# Patient Record
Sex: FEMALE | Race: WHITE | NOT HISPANIC OR LATINO
[De-identification: names, ages, dates, MRNs, and addresses within clinical notes are randomized per-mention and may not be internally consistent; named-entity substitution may affect disease eponyms.]

---

## 2021-12-08 ENCOUNTER — APPOINTMENT (OUTPATIENT)
Dept: PULMONOLOGY | Facility: CLINIC | Age: 56
End: 2021-12-08
Payer: COMMERCIAL

## 2021-12-08 VITALS
SYSTOLIC BLOOD PRESSURE: 130 MMHG | HEIGHT: 63 IN | HEART RATE: 67 BPM | BODY MASS INDEX: 36.32 KG/M2 | TEMPERATURE: 97.7 F | WEIGHT: 205 LBS | DIASTOLIC BLOOD PRESSURE: 81 MMHG | RESPIRATION RATE: 16 BRPM

## 2021-12-08 DIAGNOSIS — K21.9 GASTRO-ESOPHAGEAL REFLUX DISEASE W/OUT ESOPHAGITIS: ICD-10-CM

## 2021-12-08 DIAGNOSIS — F32.A DEPRESSION, UNSPECIFIED: ICD-10-CM

## 2021-12-08 DIAGNOSIS — Z82.49 FAMILY HISTORY OF ISCHEMIC HEART DISEASE AND OTHER DISEASES OF THE CIRCULATORY SYSTEM: ICD-10-CM

## 2021-12-08 DIAGNOSIS — R06.83 SNORING: ICD-10-CM

## 2021-12-08 DIAGNOSIS — J44.9 CHRONIC OBSTRUCTIVE PULMONARY DISEASE, UNSPECIFIED: ICD-10-CM

## 2021-12-08 DIAGNOSIS — Z83.438 FAMILY HISTORY OF OTHER DISORDER OF LIPOPROTEIN METABOLISM AND OTHER LIPIDEMIA: ICD-10-CM

## 2021-12-08 DIAGNOSIS — D86.9 SARCOIDOSIS, UNSPECIFIED: ICD-10-CM

## 2021-12-08 DIAGNOSIS — E78.5 HYPERLIPIDEMIA, UNSPECIFIED: ICD-10-CM

## 2021-12-08 DIAGNOSIS — I10 ESSENTIAL (PRIMARY) HYPERTENSION: ICD-10-CM

## 2021-12-08 DIAGNOSIS — E66.9 OBESITY, UNSPECIFIED: ICD-10-CM

## 2021-12-08 DIAGNOSIS — F17.200 NICOTINE DEPENDENCE, UNSPECIFIED, UNCOMPLICATED: ICD-10-CM

## 2021-12-08 PROBLEM — Z00.00 ENCOUNTER FOR PREVENTIVE HEALTH EXAMINATION: Status: ACTIVE | Noted: 2021-12-08

## 2021-12-08 PROCEDURE — 99204 OFFICE O/P NEW MOD 45 MIN: CPT

## 2021-12-08 RX ORDER — ATORVASTATIN CALCIUM 80 MG/1
TABLET, FILM COATED ORAL
Refills: 0 | Status: ACTIVE | COMMUNITY

## 2021-12-08 RX ORDER — FLUOXETINE HYDROCHLORIDE 40 MG/1
CAPSULE ORAL
Refills: 0 | Status: ACTIVE | COMMUNITY

## 2021-12-08 RX ORDER — MONTELUKAST 10 MG/1
10 TABLET, FILM COATED ORAL
Refills: 0 | Status: ACTIVE | COMMUNITY

## 2021-12-08 RX ORDER — METOPROLOL TARTRATE 75 MG/1
TABLET, FILM COATED ORAL
Refills: 0 | Status: ACTIVE | COMMUNITY

## 2021-12-08 RX ORDER — FAMOTIDINE 10 MG/ML
VIAL (ML) INTRAVENOUS
Refills: 0 | Status: ACTIVE | COMMUNITY

## 2021-12-08 RX ORDER — FLUTICASONE PROPION/SALMETEROL 500-50 MCG
BLISTER, WITH INHALATION DEVICE INHALATION
Refills: 0 | Status: ACTIVE | COMMUNITY

## 2021-12-08 NOTE — ASSESSMENT
[FreeTextEntry1] : 55yo f with obesity, past alcoholism. she sleeps with her sister in the same bed and her sister notices that she snores heavily, has witnessed apneas, restless sleep and sleep fragmentation. She has a history of COPD and is a current smoker. she is in the process of weaning from this. She also has depression, GERD, HTN. she feels unrefreshed in the morning and sleepy throughout the day, especially when engaged in passive activities. \par She is 18 months sober from alcoholism after her two younger children were taken away by CPS. \par \par will order HSAT to evaluate for ELIZABETH\par I explained the rationale for treatment of ELIZABETH -- to improve quality of life, daytime function and to decrease the cardiometabolic and other medical risks that are associated with untreated ELIZABETH. The patient verbalized understanding.\par I also explained that the patient can expect a follow up call once results of the above study becomes available.\par

## 2021-12-08 NOTE — CONSULT LETTER
[Dear  ___] : Dear  [unfilled], [Consult Letter:] : I had the pleasure of evaluating your patient, [unfilled]. [Please see my note below.] : Please see my note below. [Consult Closing:] : Thank you very much for allowing me to participate in the care of this patient.  If you have any questions, please do not hesitate to contact me. [Sincerely,] : Sincerely, [FreeTextEntry3] : Brittany Pitts MD

## 2021-12-08 NOTE — HISTORY OF PRESENT ILLNESS
[FreeTextEntry1] : 55yo f with obesity, past alcoholism. she sleeps with her sister in the same bed and her sister notices that she snores heavily, has witnessed apneas, restless sleep and sleep fragmentation. She has a history of COPD and is a current smoker. she is in the process of weaning from this. She also has depression, GERD, HTN. she feels unrefreshed in the morning and sleepy throughout the day, especially when engaged in passive activities. \par She is 18 months sober from alcoholism after her two younger children were taken away by CPS.

## 2023-01-31 ENCOUNTER — EMERGENCY (EMERGENCY)
Facility: HOSPITAL | Age: 58
LOS: 1 days | Discharge: HOME | End: 2023-01-31
Attending: EMERGENCY MEDICINE
Payer: COMMERCIAL

## 2023-01-31 VITALS
HEART RATE: 88 BPM | TEMPERATURE: 98 F | RESPIRATION RATE: 22 BRPM | OXYGEN SATURATION: 99 % | SYSTOLIC BLOOD PRESSURE: 101 MMHG | WEIGHT: 199.96 LBS | DIASTOLIC BLOOD PRESSURE: 67 MMHG

## 2023-01-31 VITALS
SYSTOLIC BLOOD PRESSURE: 108 MMHG | RESPIRATION RATE: 18 BRPM | OXYGEN SATURATION: 97 % | HEART RATE: 83 BPM | TEMPERATURE: 99 F | DIASTOLIC BLOOD PRESSURE: 60 MMHG

## 2023-01-31 LAB
ALBUMIN SERPL ELPH-MCNC: 4.1 G/DL — SIGNIFICANT CHANGE UP (ref 3.3–5.2)
ALP SERPL-CCNC: 55 U/L — SIGNIFICANT CHANGE UP (ref 40–120)
ALT FLD-CCNC: 20 U/L — SIGNIFICANT CHANGE UP
ANION GAP SERPL CALC-SCNC: 11 MMOL/L — SIGNIFICANT CHANGE UP (ref 5–17)
APTT BLD: 34.7 SEC — SIGNIFICANT CHANGE UP (ref 27.5–35.5)
AST SERPL-CCNC: 18 U/L — SIGNIFICANT CHANGE UP
BASOPHILS # BLD AUTO: 0.02 K/UL — SIGNIFICANT CHANGE UP (ref 0–0.2)
BASOPHILS NFR BLD AUTO: 0.3 % — SIGNIFICANT CHANGE UP (ref 0–2)
BILIRUB SERPL-MCNC: 0.3 MG/DL — LOW (ref 0.4–2)
BUN SERPL-MCNC: 15.1 MG/DL — SIGNIFICANT CHANGE UP (ref 8–20)
CALCIUM SERPL-MCNC: 9.6 MG/DL — SIGNIFICANT CHANGE UP (ref 8.4–10.5)
CHLORIDE SERPL-SCNC: 102 MMOL/L — SIGNIFICANT CHANGE UP (ref 96–108)
CO2 SERPL-SCNC: 26 MMOL/L — SIGNIFICANT CHANGE UP (ref 22–29)
CREAT SERPL-MCNC: 0.94 MG/DL — SIGNIFICANT CHANGE UP (ref 0.5–1.3)
EGFR: 71 ML/MIN/1.73M2 — SIGNIFICANT CHANGE UP
EOSINOPHIL # BLD AUTO: 0.05 K/UL — SIGNIFICANT CHANGE UP (ref 0–0.5)
EOSINOPHIL NFR BLD AUTO: 0.8 % — SIGNIFICANT CHANGE UP (ref 0–6)
FLUAV AG NPH QL: SIGNIFICANT CHANGE UP
FLUBV AG NPH QL: SIGNIFICANT CHANGE UP
GLUCOSE SERPL-MCNC: 104 MG/DL — HIGH (ref 70–99)
HCT VFR BLD CALC: 40.7 % — SIGNIFICANT CHANGE UP (ref 34.5–45)
HGB BLD-MCNC: 13.7 G/DL — SIGNIFICANT CHANGE UP (ref 11.5–15.5)
IMM GRANULOCYTES NFR BLD AUTO: 0.2 % — SIGNIFICANT CHANGE UP (ref 0–0.9)
INR BLD: 1.03 RATIO — SIGNIFICANT CHANGE UP (ref 0.88–1.16)
LYMPHOCYTES # BLD AUTO: 1.94 K/UL — SIGNIFICANT CHANGE UP (ref 1–3.3)
LYMPHOCYTES # BLD AUTO: 31.8 % — SIGNIFICANT CHANGE UP (ref 13–44)
MCHC RBC-ENTMCNC: 31.6 PG — SIGNIFICANT CHANGE UP (ref 27–34)
MCHC RBC-ENTMCNC: 33.7 GM/DL — SIGNIFICANT CHANGE UP (ref 32–36)
MCV RBC AUTO: 94 FL — SIGNIFICANT CHANGE UP (ref 80–100)
MONOCYTES # BLD AUTO: 0.69 K/UL — SIGNIFICANT CHANGE UP (ref 0–0.9)
MONOCYTES NFR BLD AUTO: 11.3 % — SIGNIFICANT CHANGE UP (ref 2–14)
NEUTROPHILS # BLD AUTO: 3.4 K/UL — SIGNIFICANT CHANGE UP (ref 1.8–7.4)
NEUTROPHILS NFR BLD AUTO: 55.6 % — SIGNIFICANT CHANGE UP (ref 43–77)
PLATELET # BLD AUTO: 233 K/UL — SIGNIFICANT CHANGE UP (ref 150–400)
POTASSIUM SERPL-MCNC: 5 MMOL/L — SIGNIFICANT CHANGE UP (ref 3.5–5.3)
POTASSIUM SERPL-SCNC: 5 MMOL/L — SIGNIFICANT CHANGE UP (ref 3.5–5.3)
PROT SERPL-MCNC: 6.7 G/DL — SIGNIFICANT CHANGE UP (ref 6.6–8.7)
PROTHROM AB SERPL-ACNC: 11.9 SEC — SIGNIFICANT CHANGE UP (ref 10.5–13.4)
RBC # BLD: 4.33 M/UL — SIGNIFICANT CHANGE UP (ref 3.8–5.2)
RBC # FLD: 12.9 % — SIGNIFICANT CHANGE UP (ref 10.3–14.5)
RSV RNA NPH QL NAA+NON-PROBE: SIGNIFICANT CHANGE UP
SARS-COV-2 RNA SPEC QL NAA+PROBE: SIGNIFICANT CHANGE UP
SODIUM SERPL-SCNC: 139 MMOL/L — SIGNIFICANT CHANGE UP (ref 135–145)
TROPONIN T SERPL-MCNC: <0.01 NG/ML — SIGNIFICANT CHANGE UP (ref 0–0.06)
WBC # BLD: 6.11 K/UL — SIGNIFICANT CHANGE UP (ref 3.8–10.5)
WBC # FLD AUTO: 6.11 K/UL — SIGNIFICANT CHANGE UP (ref 3.8–10.5)

## 2023-01-31 PROCEDURE — 94640 AIRWAY INHALATION TREATMENT: CPT

## 2023-01-31 PROCEDURE — 99285 EMERGENCY DEPT VISIT HI MDM: CPT

## 2023-01-31 PROCEDURE — 87637 SARSCOV2&INF A&B&RSV AMP PRB: CPT

## 2023-01-31 PROCEDURE — 99284 EMERGENCY DEPT VISIT MOD MDM: CPT | Mod: 25

## 2023-01-31 PROCEDURE — 71046 X-RAY EXAM CHEST 2 VIEWS: CPT

## 2023-01-31 PROCEDURE — 71046 X-RAY EXAM CHEST 2 VIEWS: CPT | Mod: 26

## 2023-01-31 PROCEDURE — 96360 HYDRATION IV INFUSION INIT: CPT

## 2023-01-31 PROCEDURE — 85025 COMPLETE CBC W/AUTO DIFF WBC: CPT

## 2023-01-31 PROCEDURE — 80053 COMPREHEN METABOLIC PANEL: CPT

## 2023-01-31 PROCEDURE — 85610 PROTHROMBIN TIME: CPT

## 2023-01-31 PROCEDURE — 84484 ASSAY OF TROPONIN QUANT: CPT

## 2023-01-31 PROCEDURE — 93010 ELECTROCARDIOGRAM REPORT: CPT

## 2023-01-31 PROCEDURE — 36415 COLL VENOUS BLD VENIPUNCTURE: CPT

## 2023-01-31 PROCEDURE — 83880 ASSAY OF NATRIURETIC PEPTIDE: CPT

## 2023-01-31 PROCEDURE — 85730 THROMBOPLASTIN TIME PARTIAL: CPT

## 2023-01-31 PROCEDURE — 93005 ELECTROCARDIOGRAM TRACING: CPT

## 2023-01-31 RX ORDER — SODIUM CHLORIDE 9 MG/ML
500 INJECTION INTRAMUSCULAR; INTRAVENOUS; SUBCUTANEOUS ONCE
Refills: 0 | Status: COMPLETED | OUTPATIENT
Start: 2023-01-31 | End: 2023-01-31

## 2023-01-31 RX ORDER — IPRATROPIUM/ALBUTEROL SULFATE 18-103MCG
3 AEROSOL WITH ADAPTER (GRAM) INHALATION ONCE
Refills: 0 | Status: COMPLETED | OUTPATIENT
Start: 2023-01-31 | End: 2023-01-31

## 2023-01-31 RX ORDER — ASPIRIN/CALCIUM CARB/MAGNESIUM 324 MG
1 TABLET ORAL
Qty: 15 | Refills: 0
Start: 2023-01-31 | End: 2023-02-14

## 2023-01-31 RX ORDER — ACETAMINOPHEN 500 MG
975 TABLET ORAL ONCE
Refills: 0 | Status: COMPLETED | OUTPATIENT
Start: 2023-01-31 | End: 2023-01-31

## 2023-01-31 RX ADMIN — SODIUM CHLORIDE 500 MILLILITER(S): 9 INJECTION INTRAMUSCULAR; INTRAVENOUS; SUBCUTANEOUS at 14:18

## 2023-01-31 RX ADMIN — Medication 975 MILLIGRAM(S): at 10:33

## 2023-01-31 RX ADMIN — Medication 3 MILLILITER(S): at 12:47

## 2023-01-31 RX ADMIN — Medication 3 MILLILITER(S): at 10:33

## 2023-01-31 RX ADMIN — Medication 60 MILLIGRAM(S): at 10:33

## 2023-01-31 NOTE — ED PROVIDER NOTE - CLINICAL SUMMARY MEDICAL DECISION MAKING FREE TEXT BOX
57-year-old female history of EtOH abuse, sarcoidosis, COPD, asthma, depression, TIA, presents to the ED complaining of cough, congestion, sore throat x1 day.  Patient states that she woke up this morning with mild chest pressure and shortness of breath. Pts labs reviewed-wnl, troponin negative x1. CXR shows no acute abnormality. EKG with no abnormalities. RVP negative. Pt received multiple nebulizer treatments and prednisone in the ED-- reports significant relief of presenting sob and chest pressure. Pt able to speak in full sentences, in no acute respiratory distress. repeat lung exam CTA b/l. Pt stable for d/c with prescriptions sent to pharmacy. Pt give precautions to return to the ED if her symptoms worsen. Pts VSS, o2 saturation 98% upon discharge.  - continue prednisone as presribed  - tessalon perles as needed for cough  - Pt has inhaler at home to be used as needed  - PCP f/u this week

## 2023-01-31 NOTE — ED PROVIDER NOTE - OBJECTIVE STATEMENT
57-year-old female history of EtOH abuse, sarcoidosis, COPD, asthma, depression, TIA, presents to the ED complaining of cough, congestion, sore throat x1 day.  Patient states that she woke up this morning with mild chest pressure and shortness of breath. Pt otherwise denies fever/chills, abd pain, n/v/c/d, dysuria, numbness/tingling/weakness and has no other complaints at this time.

## 2023-01-31 NOTE — ED PROVIDER NOTE - CPE EDP CARDIAC NORM
TRANSITIONAL CARE MANAGEMENT - HOSPITAL DISCHARGE FOLLOW-UP    Contacted Ms. Ferraro regarding follow-up for seizure after hospital discharge. She was discharged from the hospital on 05/20/2022. Review of the After Visit Summary from the recent hospitalization indicates that the patient needs to Adhere to discharge instructions and follow up with PCP.    She feels that she is doing fairly well at home.   Her diet concern is none. Overall, the patient is eating well.   Ambulation: staying the same  Fever: is not present  Pain: none  Activities of Daily Living (global): Self-care   Patient states that she does have sufficient family support. She feels that she is able to ask for assistance when needed.     Additional patient/family concerns: None .    Discharge medications were verified with the patient. She is fully compliant with the medication regimen prescribed at the time of discharge. She reports that she is not experiencing any medication side effects.    Upon discharge, the patient was to receive specialist follow-up with neuroscience. These services have been initiated. These services have been scheduled and no further arrangements are needed at this time.     Advance Directive:   The patient has the following documents:  No Advance Directives on file. Patient offered documents.    Patient has an appointment on 05/27/2022 with Abdulaziz Mendosa MD. Ms. Ferraro was reminded about the importance of keeping this appointment.      normal...

## 2023-01-31 NOTE — ED ADULT NURSE NOTE - CHPI ED NUR SYMPTOMS POS
CHEST PAIN/COUGH/DIFFICULTY BREATHING/DYSPNEA ON EXERTION/FEVER/NASAL CONGESTION/SHORTNESS OF BREATH

## 2023-01-31 NOTE — ED PROVIDER NOTE - ATTENDING APP SHARED VISIT CONTRIBUTION OF CARE
Reports nasal congestion with yellow/ greenish discharge and ear plugging since yesterday.  Developed cough overnight followed by tightness in chest and difficulty breathing.  Used Advair at 8 AM followed by albuterol at 8:45 with limited relief.  History of COPD/asthma.  Prior hospital admissions without ICU stays.  Active smoker.  Reports compliance with daily Advair and Singulair. Has albuterol inhaler and liquid but states that nebulizer is broken. Uses albuterol as needed. PE: No acute respiratory distress, speaking in full sentences, neck supple/no meningismus, moist mucous membranes, chest clear to auscultation bilateral with end expiratory wheezing/no rales/no rhonchi.  Treated with albuterol inhaler and prednisone.  Reports improvement of symptoms.  A/P: Exacerbation of COPD/asthma secondary to URI.  Steroids and inhaler treatment

## 2023-01-31 NOTE — ED ADULT TRIAGE NOTE - NS ED NURSE AMBULANCES
[facility]

## 2023-01-31 NOTE — ED PROVIDER NOTE - PATIENT PORTAL LINK FT
You can access the FollowMyHealth Patient Portal offered by [facility] by registering at the following website: [email or web address]. By joining [employer]’s FollowMyHealth portal, you will also be able to view your health information using other applications (apps) compatible with our system. You can access the FollowMyHealth Patient Portal offered by [facility] by registering at the following website: [email or web address]. By joining [employer]’s FollowMyHealth portal, you will also be able to view your health information using other applications (apps) compatible with our system. You can access the FollowMyHealth Patient Portal offered by [facility] by registering at the following website: [email or web address]. By joining [employer]’s FollowMyHealth portal, you will also be able to view your health information using other applications (apps) compatible with our system.

## 2023-01-31 NOTE — ED ADULT TRIAGE NOTE - CHIEF COMPLAINT QUOTE
Pt arrives with c/o 2 days of cough, ear pain, sore throat, chills and today woke with c/o SOB and increasing sore throat with difficulty swallowing due to the pain.

## 2023-01-31 NOTE — ED PROVIDER NOTE - PROGRESS NOTE DETAILS
Prior to discharge, pt able to ambulate in the ED without difficulty maintaining o2 saturation of 96% with no reports of acute sob.

## 2023-01-31 NOTE — ED PROVIDER NOTE - NS ED ATTENDING STATEMENT MOD
This was a shared visit with the MARISSA. I reviewed and verified the documentation and independently performed the documented:

## 2023-01-31 NOTE — ED ADULT NURSE NOTE - NSIMPLEMENTINTERV_GEN_ALL_ED
Implemented All Universal Safety Interventions:  [place] to call system. Call bell, personal items and telephone within reach. Instruct patient to call for assistance. Room bathroom lighting operational. Non-slip footwear when patient is off stretcher. Physically safe environment: no spills, clutter or unnecessary equipment. Stretcher in lowest position, wheels locked, appropriate side rails in place. Implemented All Universal Safety Interventions:  [place] to call system. Call bell, personal items and telephone within reach. Instruct patient to call for assistance. Room bathroom lighting operational. Non-slip footwear when patient is off stretcher. Physically safe environment: no spills, clutter or unnecessary equipment. Stretcher in lowest position, wheels locked, appropriate side rails in place. Implemented All Universal Safety Interventions:  [place] to call system. Call bell, personal items and telephone within reach. Instruct patient to call for assistance. Room bathroom lighting operational. Non-slip footwear when patient is off stretcher. Physically safe environment: no spills, clutter or unnecessary equipment. Stretcher in lowest position, wheels locked, appropriate side rails in place.

## 2023-09-11 ENCOUNTER — INPATIENT (INPATIENT)
Facility: HOSPITAL | Age: 58
LOS: 1 days | Discharge: HOME | DRG: 190 | End: 2023-09-13
Attending: HOSPITALIST
Payer: COMMERCIAL

## 2023-09-11 VITALS
TEMPERATURE: 100 F | HEART RATE: 92 BPM | WEIGHT: 195.11 LBS | SYSTOLIC BLOOD PRESSURE: 166 MMHG | HEIGHT: 63 IN | RESPIRATION RATE: 22 BRPM | OXYGEN SATURATION: 93 % | DIASTOLIC BLOOD PRESSURE: 98 MMHG

## 2023-09-11 DIAGNOSIS — Z98.890 OTHER SPECIFIED POSTPROCEDURAL STATES: Chronic | ICD-10-CM

## 2023-09-11 DIAGNOSIS — Z90.89 ACQUIRED ABSENCE OF OTHER ORGANS: Chronic | ICD-10-CM

## 2023-09-11 DIAGNOSIS — Z86.73 PERSONAL HISTORY OF TRANSIENT ISCHEMIC ATTACK (TIA), AND CEREBRAL INFARCTION WITHOUT RESIDUAL DEFICITS: Chronic | ICD-10-CM

## 2023-09-11 DIAGNOSIS — J18.9 PNEUMONIA, UNSPECIFIED ORGANISM: ICD-10-CM

## 2023-09-11 DIAGNOSIS — Z98.891 HISTORY OF UTERINE SCAR FROM PREVIOUS SURGERY: Chronic | ICD-10-CM

## 2023-09-11 LAB
ALBUMIN SERPL ELPH-MCNC: 4.3 G/DL — SIGNIFICANT CHANGE UP (ref 3.3–5.2)
ALP SERPL-CCNC: 63 U/L — SIGNIFICANT CHANGE UP (ref 40–120)
ALT FLD-CCNC: 19 U/L — SIGNIFICANT CHANGE UP
ANION GAP SERPL CALC-SCNC: 13 MMOL/L — SIGNIFICANT CHANGE UP (ref 5–17)
APPEARANCE UR: ABNORMAL
APTT BLD: 31 SEC — SIGNIFICANT CHANGE UP (ref 24.5–35.6)
AST SERPL-CCNC: 18 U/L — SIGNIFICANT CHANGE UP
BACTERIA # UR AUTO: ABNORMAL
BASE EXCESS BLDV CALC-SCNC: 0.5 MMOL/L — SIGNIFICANT CHANGE UP (ref -2–3)
BASE EXCESS BLDV CALC-SCNC: 4 MMOL/L — HIGH (ref -2–3)
BASOPHILS # BLD AUTO: 0.05 K/UL — SIGNIFICANT CHANGE UP (ref 0–0.2)
BASOPHILS NFR BLD AUTO: 0.3 % — SIGNIFICANT CHANGE UP (ref 0–2)
BILIRUB SERPL-MCNC: 0.8 MG/DL — SIGNIFICANT CHANGE UP (ref 0.4–2)
BILIRUB UR-MCNC: NEGATIVE — SIGNIFICANT CHANGE UP
BUN SERPL-MCNC: 9.3 MG/DL — SIGNIFICANT CHANGE UP (ref 8–20)
CA-I SERPL-SCNC: 1.12 MMOL/L — LOW (ref 1.15–1.33)
CA-I SERPL-SCNC: 1.17 MMOL/L — SIGNIFICANT CHANGE UP (ref 1.15–1.33)
CALCIUM SERPL-MCNC: 9.7 MG/DL — SIGNIFICANT CHANGE UP (ref 8.4–10.5)
CHLORIDE BLDV-SCNC: 103 MMOL/L — SIGNIFICANT CHANGE UP (ref 96–108)
CHLORIDE SERPL-SCNC: 99 MMOL/L — SIGNIFICANT CHANGE UP (ref 96–108)
CO2 SERPL-SCNC: 26 MMOL/L — SIGNIFICANT CHANGE UP (ref 22–29)
COLOR SPEC: YELLOW — SIGNIFICANT CHANGE UP
CREAT SERPL-MCNC: 0.65 MG/DL — SIGNIFICANT CHANGE UP (ref 0.5–1.3)
DIFF PNL FLD: ABNORMAL
EGFR: 103 ML/MIN/1.73M2 — SIGNIFICANT CHANGE UP
EOSINOPHIL # BLD AUTO: 0.02 K/UL — SIGNIFICANT CHANGE UP (ref 0–0.5)
EOSINOPHIL NFR BLD AUTO: 0.1 % — SIGNIFICANT CHANGE UP (ref 0–6)
EPI CELLS # UR: SIGNIFICANT CHANGE UP
GAS PNL BLDV: 134 MMOL/L — LOW (ref 136–145)
GAS PNL BLDV: 135 MMOL/L — LOW (ref 136–145)
GAS PNL BLDV: SIGNIFICANT CHANGE UP
GLUCOSE BLDC GLUCOMTR-MCNC: 180 MG/DL — HIGH (ref 70–99)
GLUCOSE BLDV-MCNC: 139 MG/DL — HIGH (ref 70–99)
GLUCOSE BLDV-MCNC: 218 MG/DL — HIGH (ref 70–99)
GLUCOSE SERPL-MCNC: 132 MG/DL — HIGH (ref 70–99)
GLUCOSE UR QL: 100 MG/DL
HCO3 BLDV-SCNC: 26 MMOL/L — SIGNIFICANT CHANGE UP (ref 22–29)
HCO3 BLDV-SCNC: 29 MMOL/L — SIGNIFICANT CHANGE UP (ref 22–29)
HCT VFR BLD CALC: 42.5 % — SIGNIFICANT CHANGE UP (ref 34.5–45)
HCT VFR BLDA CALC: 42 % — SIGNIFICANT CHANGE UP
HCT VFR BLDA CALC: 46 % — SIGNIFICANT CHANGE UP
HGB BLD CALC-MCNC: 13.9 G/DL — SIGNIFICANT CHANGE UP (ref 11.7–16.1)
HGB BLD CALC-MCNC: 15.4 G/DL — SIGNIFICANT CHANGE UP (ref 11.7–16.1)
HGB BLD-MCNC: 14.7 G/DL — SIGNIFICANT CHANGE UP (ref 11.5–15.5)
IMM GRANULOCYTES NFR BLD AUTO: 0.5 % — SIGNIFICANT CHANGE UP (ref 0–0.9)
INR BLD: 0.94 RATIO — SIGNIFICANT CHANGE UP (ref 0.85–1.18)
KETONES UR-MCNC: NEGATIVE — SIGNIFICANT CHANGE UP
LACTATE BLDV-MCNC: 2.3 MMOL/L — HIGH (ref 0.5–2)
LACTATE BLDV-MCNC: 2.8 MMOL/L — HIGH (ref 0.5–2)
LEUKOCYTE ESTERASE UR-ACNC: NEGATIVE — SIGNIFICANT CHANGE UP
LYMPHOCYTES # BLD AUTO: 1.97 K/UL — SIGNIFICANT CHANGE UP (ref 1–3.3)
LYMPHOCYTES # BLD AUTO: 13.5 % — SIGNIFICANT CHANGE UP (ref 13–44)
MCHC RBC-ENTMCNC: 32.7 PG — SIGNIFICANT CHANGE UP (ref 27–34)
MCHC RBC-ENTMCNC: 34.6 GM/DL — SIGNIFICANT CHANGE UP (ref 32–36)
MCV RBC AUTO: 94.4 FL — SIGNIFICANT CHANGE UP (ref 80–100)
MONOCYTES # BLD AUTO: 1.28 K/UL — HIGH (ref 0–0.9)
MONOCYTES NFR BLD AUTO: 8.8 % — SIGNIFICANT CHANGE UP (ref 2–14)
NEUTROPHILS # BLD AUTO: 11.15 K/UL — HIGH (ref 1.8–7.4)
NEUTROPHILS NFR BLD AUTO: 76.8 % — SIGNIFICANT CHANGE UP (ref 43–77)
NITRITE UR-MCNC: NEGATIVE — SIGNIFICANT CHANGE UP
PCO2 BLDV: 50 MMHG — HIGH (ref 39–42)
PCO2 BLDV: 52 MMHG — HIGH (ref 39–42)
PH BLDV: 7.33 — SIGNIFICANT CHANGE UP (ref 7.32–7.43)
PH BLDV: 7.36 — SIGNIFICANT CHANGE UP (ref 7.32–7.43)
PH UR: 6 — SIGNIFICANT CHANGE UP (ref 5–8)
PLATELET # BLD AUTO: 207 K/UL — SIGNIFICANT CHANGE UP (ref 150–400)
PO2 BLDV: 45 MMHG — SIGNIFICANT CHANGE UP (ref 25–45)
PO2 BLDV: 60 MMHG — HIGH (ref 25–45)
POTASSIUM BLDV-SCNC: 4 MMOL/L — SIGNIFICANT CHANGE UP (ref 3.5–5.1)
POTASSIUM BLDV-SCNC: 4.3 MMOL/L — SIGNIFICANT CHANGE UP (ref 3.5–5.1)
POTASSIUM SERPL-MCNC: 4.2 MMOL/L — SIGNIFICANT CHANGE UP (ref 3.5–5.3)
POTASSIUM SERPL-SCNC: 4.2 MMOL/L — SIGNIFICANT CHANGE UP (ref 3.5–5.3)
PROT SERPL-MCNC: 7 G/DL — SIGNIFICANT CHANGE UP (ref 6.6–8.7)
PROT UR-MCNC: 30 MG/DL
PROTHROM AB SERPL-ACNC: 10.4 SEC — SIGNIFICANT CHANGE UP (ref 9.5–13)
RAPID RVP RESULT: SIGNIFICANT CHANGE UP
RBC # BLD: 4.5 M/UL — SIGNIFICANT CHANGE UP (ref 3.8–5.2)
RBC # FLD: 13.7 % — SIGNIFICANT CHANGE UP (ref 10.3–14.5)
RBC CASTS # UR COMP ASSIST: SIGNIFICANT CHANGE UP /HPF (ref 0–4)
SAO2 % BLDV: 73.2 % — SIGNIFICANT CHANGE UP
SAO2 % BLDV: 89.5 % — SIGNIFICANT CHANGE UP
SARS-COV-2 RNA SPEC QL NAA+PROBE: SIGNIFICANT CHANGE UP
SODIUM SERPL-SCNC: 138 MMOL/L — SIGNIFICANT CHANGE UP (ref 135–145)
SP GR SPEC: 1.01 — SIGNIFICANT CHANGE UP (ref 1.01–1.02)
TROPONIN T SERPL-MCNC: <0.01 NG/ML — SIGNIFICANT CHANGE UP (ref 0–0.06)
UROBILINOGEN FLD QL: NEGATIVE MG/DL — SIGNIFICANT CHANGE UP
WBC # BLD: 14.55 K/UL — HIGH (ref 3.8–10.5)
WBC # FLD AUTO: 14.55 K/UL — HIGH (ref 3.8–10.5)
WBC UR QL: SIGNIFICANT CHANGE UP /HPF (ref 0–5)

## 2023-09-11 PROCEDURE — 71046 X-RAY EXAM CHEST 2 VIEWS: CPT | Mod: 26

## 2023-09-11 PROCEDURE — 99285 EMERGENCY DEPT VISIT HI MDM: CPT

## 2023-09-11 PROCEDURE — 99223 1ST HOSP IP/OBS HIGH 75: CPT | Mod: GC

## 2023-09-11 PROCEDURE — 93010 ELECTROCARDIOGRAM REPORT: CPT

## 2023-09-11 RX ORDER — IPRATROPIUM/ALBUTEROL SULFATE 18-103MCG
3 AEROSOL WITH ADAPTER (GRAM) INHALATION ONCE
Refills: 0 | Status: COMPLETED | OUTPATIENT
Start: 2023-09-11 | End: 2023-09-11

## 2023-09-11 RX ORDER — CEFTRIAXONE 500 MG/1
1000 INJECTION, POWDER, FOR SOLUTION INTRAMUSCULAR; INTRAVENOUS ONCE
Refills: 0 | Status: COMPLETED | OUTPATIENT
Start: 2023-09-11 | End: 2023-09-11

## 2023-09-11 RX ORDER — MONTELUKAST 4 MG/1
10 TABLET, CHEWABLE ORAL AT BEDTIME
Refills: 0 | Status: DISCONTINUED | OUTPATIENT
Start: 2023-09-11 | End: 2023-09-13

## 2023-09-11 RX ORDER — SODIUM CHLORIDE 9 MG/ML
1000 INJECTION, SOLUTION INTRAVENOUS
Refills: 0 | Status: DISCONTINUED | OUTPATIENT
Start: 2023-09-11 | End: 2023-09-13

## 2023-09-11 RX ORDER — SODIUM CHLORIDE 9 MG/ML
1000 INJECTION INTRAMUSCULAR; INTRAVENOUS; SUBCUTANEOUS ONCE
Refills: 0 | Status: COMPLETED | OUTPATIENT
Start: 2023-09-11 | End: 2023-09-11

## 2023-09-11 RX ORDER — NORTRIPTYLINE HYDROCHLORIDE 10 MG/1
50 CAPSULE ORAL AT BEDTIME
Refills: 0 | Status: DISCONTINUED | OUTPATIENT
Start: 2023-09-11 | End: 2023-09-13

## 2023-09-11 RX ORDER — DEXTROSE 50 % IN WATER 50 %
25 SYRINGE (ML) INTRAVENOUS ONCE
Refills: 0 | Status: DISCONTINUED | OUTPATIENT
Start: 2023-09-11 | End: 2023-09-13

## 2023-09-11 RX ORDER — LOPERAMIDE HCL 2 MG
2 TABLET ORAL
Refills: 0 | Status: DISCONTINUED | OUTPATIENT
Start: 2023-09-11 | End: 2023-09-13

## 2023-09-11 RX ORDER — ASPIRIN/CALCIUM CARB/MAGNESIUM 324 MG
81 TABLET ORAL DAILY
Refills: 0 | Status: DISCONTINUED | OUTPATIENT
Start: 2023-09-11 | End: 2023-09-13

## 2023-09-11 RX ORDER — METOPROLOL TARTRATE 50 MG
25 TABLET ORAL DAILY
Refills: 0 | Status: DISCONTINUED | OUTPATIENT
Start: 2023-09-11 | End: 2023-09-13

## 2023-09-11 RX ORDER — INSULIN LISPRO 100/ML
VIAL (ML) SUBCUTANEOUS
Refills: 0 | Status: DISCONTINUED | OUTPATIENT
Start: 2023-09-11 | End: 2023-09-13

## 2023-09-11 RX ORDER — ENOXAPARIN SODIUM 100 MG/ML
40 INJECTION SUBCUTANEOUS EVERY 12 HOURS
Refills: 0 | Status: DISCONTINUED | OUTPATIENT
Start: 2023-09-11 | End: 2023-09-13

## 2023-09-11 RX ORDER — CEFTRIAXONE 500 MG/1
1000 INJECTION, POWDER, FOR SOLUTION INTRAMUSCULAR; INTRAVENOUS ONCE
Refills: 0 | Status: DISCONTINUED | OUTPATIENT
Start: 2023-09-11 | End: 2023-09-11

## 2023-09-11 RX ORDER — CEFTRIAXONE 500 MG/1
1000 INJECTION, POWDER, FOR SOLUTION INTRAMUSCULAR; INTRAVENOUS EVERY 24 HOURS
Refills: 0 | Status: DISCONTINUED | OUTPATIENT
Start: 2023-09-12 | End: 2023-09-13

## 2023-09-11 RX ORDER — ALBUTEROL 90 UG/1
2 AEROSOL, METERED ORAL EVERY 6 HOURS
Refills: 0 | Status: DISCONTINUED | OUTPATIENT
Start: 2023-09-11 | End: 2023-09-13

## 2023-09-11 RX ORDER — FLUOXETINE HCL 10 MG
60 CAPSULE ORAL DAILY
Refills: 0 | Status: DISCONTINUED | OUTPATIENT
Start: 2023-09-11 | End: 2023-09-13

## 2023-09-11 RX ORDER — ACAMPROSATE CALCIUM 333 MG/1
333 TABLET, DELAYED RELEASE ORAL THREE TIMES A DAY
Refills: 0 | Status: DISCONTINUED | OUTPATIENT
Start: 2023-09-11 | End: 2023-09-13

## 2023-09-11 RX ORDER — CLOPIDOGREL BISULFATE 75 MG/1
75 TABLET, FILM COATED ORAL DAILY
Refills: 0 | Status: DISCONTINUED | OUTPATIENT
Start: 2023-09-11 | End: 2023-09-13

## 2023-09-11 RX ORDER — ATORVASTATIN CALCIUM 80 MG/1
40 TABLET, FILM COATED ORAL AT BEDTIME
Refills: 0 | Status: DISCONTINUED | OUTPATIENT
Start: 2023-09-11 | End: 2023-09-13

## 2023-09-11 RX ORDER — DEXTROSE 50 % IN WATER 50 %
15 SYRINGE (ML) INTRAVENOUS ONCE
Refills: 0 | Status: DISCONTINUED | OUTPATIENT
Start: 2023-09-11 | End: 2023-09-13

## 2023-09-11 RX ORDER — ACETAMINOPHEN 500 MG
650 TABLET ORAL EVERY 6 HOURS
Refills: 0 | Status: DISCONTINUED | OUTPATIENT
Start: 2023-09-11 | End: 2023-09-13

## 2023-09-11 RX ORDER — AZITHROMYCIN 500 MG/1
500 TABLET, FILM COATED ORAL EVERY 24 HOURS
Refills: 0 | Status: DISCONTINUED | OUTPATIENT
Start: 2023-09-12 | End: 2023-09-13

## 2023-09-11 RX ORDER — MECLIZINE HCL 12.5 MG
25 TABLET ORAL EVERY 8 HOURS
Refills: 0 | Status: DISCONTINUED | OUTPATIENT
Start: 2023-09-11 | End: 2023-09-13

## 2023-09-11 RX ORDER — FAMOTIDINE 10 MG/ML
20 INJECTION INTRAVENOUS
Refills: 0 | Status: DISCONTINUED | OUTPATIENT
Start: 2023-09-11 | End: 2023-09-13

## 2023-09-11 RX ORDER — ACETAMINOPHEN 500 MG
1000 TABLET ORAL ONCE
Refills: 0 | Status: COMPLETED | OUTPATIENT
Start: 2023-09-11 | End: 2023-09-11

## 2023-09-11 RX ORDER — GLUCAGON INJECTION, SOLUTION 0.5 MG/.1ML
1 INJECTION, SOLUTION SUBCUTANEOUS ONCE
Refills: 0 | Status: DISCONTINUED | OUTPATIENT
Start: 2023-09-11 | End: 2023-09-13

## 2023-09-11 RX ORDER — AZITHROMYCIN 500 MG/1
500 TABLET, FILM COATED ORAL ONCE
Refills: 0 | Status: COMPLETED | OUTPATIENT
Start: 2023-09-11 | End: 2023-09-11

## 2023-09-11 RX ADMIN — NORTRIPTYLINE HYDROCHLORIDE 50 MILLIGRAM(S): 10 CAPSULE ORAL at 22:14

## 2023-09-11 RX ADMIN — ATORVASTATIN CALCIUM 40 MILLIGRAM(S): 80 TABLET, FILM COATED ORAL at 22:13

## 2023-09-11 RX ADMIN — Medication 40 MILLIGRAM(S): at 09:29

## 2023-09-11 RX ADMIN — Medication 3 MILLILITER(S): at 17:46

## 2023-09-11 RX ADMIN — CEFTRIAXONE 1000 MILLIGRAM(S): 500 INJECTION, POWDER, FOR SOLUTION INTRAMUSCULAR; INTRAVENOUS at 14:00

## 2023-09-11 RX ADMIN — Medication 2 MILLIGRAM(S): at 18:25

## 2023-09-11 RX ADMIN — SODIUM CHLORIDE 1000 MILLILITER(S): 9 INJECTION INTRAMUSCULAR; INTRAVENOUS; SUBCUTANEOUS at 13:20

## 2023-09-11 RX ADMIN — Medication 400 MILLIGRAM(S): at 12:17

## 2023-09-11 RX ADMIN — MONTELUKAST 10 MILLIGRAM(S): 4 TABLET, CHEWABLE ORAL at 22:13

## 2023-09-11 RX ADMIN — ENOXAPARIN SODIUM 40 MILLIGRAM(S): 100 INJECTION SUBCUTANEOUS at 18:25

## 2023-09-11 RX ADMIN — SODIUM CHLORIDE 1000 MILLILITER(S): 9 INJECTION INTRAMUSCULAR; INTRAVENOUS; SUBCUTANEOUS at 09:29

## 2023-09-11 RX ADMIN — Medication 3 MILLILITER(S): at 09:29

## 2023-09-11 RX ADMIN — AZITHROMYCIN 255 MILLIGRAM(S): 500 TABLET, FILM COATED ORAL at 09:28

## 2023-09-11 RX ADMIN — ACAMPROSATE CALCIUM 333 MILLIGRAM(S): 333 TABLET, DELAYED RELEASE ORAL at 22:14

## 2023-09-11 RX ADMIN — Medication 25 MILLIGRAM(S): at 18:25

## 2023-09-11 NOTE — H&P ADULT - ATTENDING COMMENTS
58 yr old female with obesity, hypertension, Asthma/COPD, smoker, alcohol abuse, TIA, depression presents with 3 days of productive cough, chills and fever, not relieved with inhalers at home. Reports sick contacts at work. Smoker >35 yrs, no pets, not on home oxygen. Febrile, leucocytosis. Sober for 60 days.   on exam,  alert, oriented x 3  throat wnl  cvs: regular  lungs: b/l air entry, mild wheeze  abdo: soft, bs+  ext: no edema, tenderness  imp:  copd exacerbation   ? CAP  h/o tia  hypertension  plan:   continue IV steroids  Duoneb  continue Ceftriaxone and Azithromycin  check urine Legionella  monitor FS while on steroids  has been sober for 60 days, strong personal and family history of alcohol abuse  dvt ppx  discussed with patient.  anticipate discharge in 24-48 hrs, pending clinical progress.

## 2023-09-11 NOTE — PATIENT PROFILE ADULT - FALL HARM RISK - UNIVERSAL INTERVENTIONS
Bed in lowest position, wheels locked, appropriate side rails in place/Call bell, personal items and telephone in reach/Instruct patient to call for assistance before getting out of bed or chair/Non-slip footwear when patient is out of bed/[place] to call system/Physically safe environment - no spills, clutter or unnecessary equipment/Purposeful Proactive Rounding/Room/bathroom lighting operational, light cord in reach Bed in lowest position, wheels locked, appropriate side rails in place/Call bell, personal items and telephone in reach/Instruct patient to call for assistance before getting out of bed or chair/Non-slip footwear when patient is out of bed/[place] to call system/Physically safe environment - no spills, clutter or unnecessary equipment/Purposeful Proactive Rounding/Room/bathroom lighting operational, light cord in reach Bed in lowest position, wheels locked, appropriate side rails in place/Call bell, personal items and telephone in reach/Instruct patient to call for assistance before getting out of bed or chair/Non-slip footwear when patient is out of bed/[place] to call system/Physically safe environment - no spills, clutter or unnecessary equipment/Purposeful Proactive Rounding/Room/bathroom lighting operational, light cord in reach

## 2023-09-11 NOTE — ED ADULT NURSE NOTE - OBJECTIVE STATEMENT
Pt presents to ED for cold like symptoms x 5 days. Sorethroat, fatigue and cough. Fever of 100.4  at home

## 2023-09-11 NOTE — H&P ADULT - NSHPSOCIALHISTORY_GEN_ALL_CORE
Tobacco: Tobacco: 7.9 pack year history  Alcohol: sober for past 60 days.   Recreational drugs: denies

## 2023-09-11 NOTE — ED ADULT TRIAGE NOTE - AS HEIGHT TYPE
CC:   Chief Complaint   Patient presents with   • Follow-up     Tachycardia       HPI:  Patient is here to follow-up on tachycardia.  He was seen at the ER 2 weeks ago for syncopal episode, however this was when he was at the dentist office, and all signs point to vasovagal etiology for that episode.  EKG and blood work at that time were unremarkable. He continues to deny any palpitations, lightheadedness, dizziness, syncope, or shortness of breath.  He is taking metoprolol 25 mg daily, HCTZ 25 mg daily, and lisinopril 40 mg daily, as prescribed.  He last took all of these medications today.  He does have a family history of myocardial infarction in his father, who passed away from this at age 45.      Patient was also diagnosed with severe sleep apnea by sleep study about 2 months ago. He saw sleep medicine physician, who recommended repeat sleep study for CPAP/BiPAP titration. This has not yet been scheduled. Patient is not sure why.    1. Tachycardia    2. Dyslipidemia, 10 year ASCVD 9.6%    3. Severe IRON (obstructive sleep apnea)    4. Family history of ischemic heart disease      Problem list, medications, allergies were updated in EPIC.    Review of Systems:    Review of Systems   Constitutional: Negative for chills and fever.   HENT: Negative for congestion and sore throat.    Eyes: Negative for discharge and redness.   Respiratory: Negative for cough and shortness of breath.    Cardiovascular: Negative for chest pain and palpitations.   Gastrointestinal: Negative for abdominal pain.   Skin: Negative for itching and rash.   Neurological: Negative for dizziness and headaches.     OBJECTIVE    Visit Vitals  BP 98/70   Pulse 120   Temp 99.3 °F (37.4 °C) (Oral)   Resp 16   Ht 5' 11\" (1.803 m)   Wt (!) 179.9 kg   BMI 55.31 kg/m²     Examination of the patient reveals:     Physical Exam   Constitutional: He is oriented to person, place, and time and well-developed, well-nourished, and in no distress.   HENT:    Head: Normocephalic and atraumatic.   Eyes: Conjunctivae are normal. Pupils are equal, round, and reactive to light.   Neck: Normal range of motion. Neck supple.   Cardiovascular: Regular rhythm and normal heart sounds.  Tachycardia present.    No murmur heard.  Pulmonary/Chest: Effort normal and breath sounds normal. He has no wheezes. He has no rales.   Abdominal: Soft. There is no tenderness. There is no rebound and no guarding.   Morbidly obese   Musculoskeletal: Normal range of motion. He exhibits no edema.   Lymphadenopathy:     He has no cervical adenopathy.   Neurological: He is alert and oriented to person, place, and time.   Skin: Skin is warm and dry. No rash noted. No erythema.   Psychiatric: Affect normal.   Vitals reviewed.      Assessment and Plan:    1. Tachycardia  -  Tachycardia persistent, even after patient has been sitting in exam room for >20 minutes.  EKG while tachycardic today shows sinus tachycardia, as expected, however etiology is unclear.  Dehydration vs deconditioning vs valvular or other structural cardiac abnormality (increased suspicion for this given patient's family history), which we will rule out with an ECHO.    - ELECTROCARDIOGRAM 12-LEAD; Future  - ECHO M-MODE/2D/DOPPLER (ROUTINE); Future    2. Dyslipidemia, 10 year ASCVD 9.6%  - atorvastatin (LIPITOR) 40 MG tablet; Take 1 tablet by mouth daily.  Dispense: 90 tablet; Refill: 3    3. Severe IRON (obstructive sleep apnea)  -  Needs repeat sleep study for CPAP/BiPAP titration.    - POLYSOMNOGRAPHY WITH CPAP; Future    4. Family history of ischemic heart disease  - ECHO M-MODE/2D/DOPPLER (ROUTINE); Future    Return in about 1 month (around 7/21/2018) for F/u tachycardia. or sooner if needed.  Pt to call prior to next f/u appointment if any worsening occurs.     The patient was seen and discussed with Dr. Benoit, who agrees with the findings and plan.    Julius Echavarria DO          stated

## 2023-09-11 NOTE — H&P ADULT - NSHPPHYSICALEXAM_GEN_ALL_CORE
VITALS:    T(C): 37.2 (09-11-23 @ 15:34), Max: 38.1 (09-11-23 @ 11:00)  HR: 84 (09-11-23 @ 15:34) (82 - 92)  BP: 139/74 (09-11-23 @ 15:34) (135/81 - 166/98)  RR: 18 (09-11-23 @ 15:34) (18 - 22)  SpO2: 96% (09-11-23 @ 15:34) (92% - 96%)    PHYSICAL EXAM:    CONSTITUTIONAL: Obese female, lying in bed comfortably   EYES:  No conjunctival or scleral injection, non-icteric  ENMT: Oral mucosa with moist membranes. +poor dentition; no pharyngeal injection or exudates  RESP: No respiratory distress, no use of accessory muscles; + end-expiratory wheezes throughout  CV: RRR, +S1,S2, no murmurs; no peripheral edema  GI: Soft, obese, nontender, no palpable masses  MSK:  No cyanosis; 5/5 strength in all extremities  SKIN: No rashes or ulcers noted  NEURO: Aensation intact in upper and lower extremities b/l to light touch   PSYCH: A+A&O x 3

## 2023-09-11 NOTE — ED PROVIDER NOTE - CLINICAL SUMMARY MEDICAL DECISION MAKING FREE TEXT BOX
58F presenting for evaluation of shortness of breath, cough, and fevers; workup as sepsis, treat for mixed pna / asthma exacerbation; follow up studies, reassess, dispo.

## 2023-09-11 NOTE — H&P ADULT - NSICDXFAMILYHX_GEN_ALL_CORE_FT
FAMILY HISTORY:  Father  Still living? Unknown  Family history of angina, Age at diagnosis: Age Unknown  Family history of stroke or transient ischemic attack in father, Age at diagnosis: Age Unknown  FH: glaucoma, Age at diagnosis: Age Unknown  FH: myocardial infarction, Age at diagnosis: Age Unknown  FH: type 2 diabetes mellitus, Age at diagnosis: Age Unknown    Mother  Still living? Unknown  FH: alcohol abuse, Age at diagnosis: Age Unknown    Sibling  Still living? Unknown  FH: type 2 diabetes mellitus, Age at diagnosis: Age Unknown

## 2023-09-11 NOTE — H&P ADULT - NSICDXPASTMEDICALHX_GEN_ALL_CORE_FT
PAST MEDICAL HISTORY:  Alcohol abuse     Angina pectoris     COPD with asthma     IBS (irritable bowel syndrome)     Major depression     Post traumatic stress disorder (PTSD)     Sarcoidosis     Transient ischemic attack
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
## 2023-09-11 NOTE — H&P ADULT - ASSESSMENT
ASSESSMENT:   58 year old female with PMHx significant for alcohol dependency, sarcoidosis, COPD, asthma, TIA x2, hyperlipidemia, vertigo angina, IBS-D, and MDD presented due to fever, productive cough, chest tightness, fatigue, and SOB that began on 9/8/2023 and worsened over the weekend. Patient had sick contacts at work. In ED, patient received Azithromycin 500  mg IV x1, Ceftriaxone 1000 mg IV x 1, Duoneb x1, and Solu-medrol 40 mg IV x 1. Etiology likely COPD Exacerbation secondary to Community Acquired Pneumonia.      COPD Exacerbation 2/2 Community Acquired Pneumonia  - Admit to medicine, with   - Afebrile, leukocytosis  - Saturating well on room air  - Received Ceftriaxone 1000 mg IV x1, Azithromycin 500 mg IV x 1, Duoneb x1, Solu-medrol 40 mg IV x 1 in ED  - Ceftriaxone 1000 mg IV q24h for 5 days  - Azithromycin 500 mg IV q24h for2 days, then transition to PO  - Solu-medrol 40 mg IV q8h  - Duoneb q4h PRN  - CXR reviewed personally: negative, official read ------> Pending  - Montelukast 10 mg  - Advair 250 mcg/50 mcg diskus    Asthma  -Albuterol HFA inhaler    Major Depressive Disorder  -Fluoxetine 60 mg   -Nortriptyline 50 mg     HLD  -Atorvastatin 40 mg     Hx of TIA  -Clopidogrel 75 mg  -Aspirin 81 mg    HTN  -Metoprolol succinate ER 25 mg    IBS-D  -Loperamide 2 mg    Peptic Ulcer  -Famotidiine 20 mg    Alcohol Dependency  -Acamprosate 333 mg    Vertigo  -Meclizine 25 mg    VTE ppx  Lovenox 40 mg BID ASSESSMENT:   58 year old female with PMHx significant for alcohol dependency, sarcoidosis, COPD, asthma, TIA x2, hyperlipidemia, vertigo angina, IBS-D, and MDD presented due to fever, productive cough, chest tightness, fatigue, and SOB that began on 9/8/2023 and worsened over the weekend. Patient had sick contacts at work. In ED, patient received Azithromycin 500  mg IV x1, Ceftriaxone 1000 mg IV x 1, Duoneb x1, and Solu-medrol 40 mg IV x 1. Etiology likely COPD Exacerbation secondary to Community Acquired Pneumonia.      COPD Exacerbation 2/2 Community Acquired Pneumonia  - Admit to medicine, with   - Afebrile, leukocytosis  - Saturating well on room air  - Received Duoneb x1, Solu-medrol 40 mg IV x 1 in ED  - Received Ceftriaxone 1000 mg IV x1 in ED  - Received Azithromycin 500 mg IV x 1 in ED  - No adverse reactions to antibiotics despite other allergies  - Per pharmacy, ok to continue with Ceftriaxone and Azithromycin  - Ceftriaxone 1000 mg IV q24h for 5 days  - Azithromycin 500 mg IV q24h for2 days, then transition to PO  - Solu-medrol 40 mg IV q8h  - Duoneb q4h PRN  - CXR reviewed personally: negative, official read ------> Pending  - Montelukast 10 mg    Asthma  -Albuterol HFA inhaler    Major Depressive Disorder  -Fluoxetine 60 mg   -Nortriptyline 50 mg     HLD  -Atorvastatin 40 mg     Hx of TIA  -Clopidogrel 75 mg  -Aspirin 81 mg    HTN  -Metoprolol succinate ER 25 mg    IBS-D  -Loperamide 2 mg    Peptic Ulcer  -Famotidiine 20 mg    Alcohol Dependency  -Acamprosate 333 mg    Vertigo  -Meclizine 25 mg    VTE ppx  Lovenox 40 mg BID

## 2023-09-11 NOTE — H&P ADULT - NSHPREVIEWOFSYSTEMS_GEN_ALL_CORE
REVIEW OF SYSTEMS:    CONSTITUTIONAL - + fatigue, + fever, no diaphoresis  SKIN - no rashes or lesions  EYES - no changes in vision, no eye pain  ENT - no change in hearing, no difficulty swallowing  RESPIRATORY - + shortness of breath, + cough  CARDIAC - no chest pain, no palpitations  GI - no abdominal pain, no nausea, no vomiting  GENITOURINARY -  no dysuria; no hematuria,    MUSCULOSKELETAL: + chest wall muscle pain, no joint pain, no swelling  NEUROLOGIC -  no headache, no paresthesias

## 2023-09-11 NOTE — PATIENT PROFILE ADULT - MST ORDER GENERATE MLM HIDDEN
Pt is requesting a 10-day supply of Xarelto 10mg, as she is going out of town and will not have time to fill this Rx.  Agree to a 2-wk supply; will have pt  at .  She does have anticoag f/u next mo in [place].      ENEDELIA Jackson  [facility]    
MST Score 2 or >

## 2023-09-11 NOTE — H&P ADULT - HISTORY OF PRESENT ILLNESS
58 year old female with PMHx of alcohol dependency, sarcoidosis, COPD, asthma, TIA x2, hyperlipidemia, vertigo, angina, IBS-D, and MDD presented due to productive cough and SOB that began on 9/8/2023 and worsened over the weekend. Patient felt chest tightness, weak, and lightheaded with yellow sputum production. Patient reported fever of 100.4 F at home and symptoms did not improve with her Albuterol and Singulair inhalers as well as Tylenol and Aspirin. Patient reports that she still has cough but it is no longer productive but has chest wall muscle pain. In ED, patient received Azithromycin 500  mg IV x1, Ceftriaxone 1000 mg IV x 1, Duoneb x1, and Solu-medrol 40 mg IV x 1. Patient admitted for Community Acquired Pneumonia and due to meeting SIRS criteria.  58 year old female with PMHx of alcohol dependency, sarcoidosis, COPD, asthma, TIA x2, hyperlipidemia, vertigo, angina, IBS-D, and MDD presented due to productive cough and SOB that began on 9/8/2023 and worsened over the weekend. Patient felt chest tightness, weak, and lightheaded with yellow sputum production. She reported sick contacts at work. Patient reported fever of 100.4 F at home and symptoms did not improve with her Albuterol and Singulair inhalers as well as Tylenol and Aspirin. Patient reports that she still has cough but it is no longer productive but has chest wall muscle pain. Patient does not use oxygen at home but over the past year, she has been hospitalized 2-3 times for COPD exacerbation. In ED, patient received Azithromycin 500  mg IV x1, Ceftriaxone 1000 mg IV x 1, Duoneb x1, and Solu-medrol 40 mg IV x 1. Patient admitted for Community Acquired Pneumonia and due to meeting SIRS criteria.

## 2023-09-11 NOTE — ED PROVIDER NOTE - PHYSICAL EXAMINATION
General: Awake, alert, lying in bed in NAD  HEENT: Normocephalic, atraumatic. No scleral icterus or conjunctival injection. EOMI. Moist mucous membranes. Oropharynx clear.   Neck:. Soft and supple.  Cardiac: RRR, Peripheral pulses 2+ and symmetric. No LE edema.  Resp: R-sided crackles, RLL, wheezes is heard throughout. No accessory muscle use  Abd: Soft, non-tender, non-distended. No guarding, rebound, or rigidity.  Back: Spine midline and non-tender.   Skin: No rashes, abrasions, or lacerations.  Neuro: AO x 4. Moves all extremities symmetrically. Motor strength and sensation grossly intact.  Psych: Appropriate mood and affect

## 2023-09-11 NOTE — ED ADULT NURSE NOTE - NSFALLUNIVINTERV_ED_ALL_ED
Bed/Stretcher in lowest position, wheels locked, appropriate side rails in place/Call bell, personal items and telephone in reach/Instruct patient to call for assistance before getting out of bed/chair/stretcher/Non-slip footwear applied when patient is off stretcher/[place] to call system/Physically safe environment - no spills, clutter or unnecessary equipment/Purposeful proactive rounding/Room/bathroom lighting operational, light cord in reach Bed/Stretcher in lowest position, wheels locked, appropriate side rails in place/Call bell, personal items and telephone in reach/Instruct patient to call for assistance before getting out of bed/chair/stretcher/Non-slip footwear applied when patient is off stretcher/[place] to call system/Physically safe environment - no spills, clutter or unnecessary equipment/Purposeful proactive rounding/Room/bathroom lighting operational, light cord in reach Bed/Stretcher in lowest position, wheels locked, appropriate side rails in place/Call bell, personal items and telephone in reach/Instruct patient to call for assistance before getting out of bed/chair/stretcher/Non-slip footwear applied when patient is off stretcher/[place] to call system/Physically safe environment - no spills, clutter or unnecessary equipment/Purposeful proactive rounding/Room/bathroom lighting operational, light cord in reach

## 2023-09-11 NOTE — ED PROVIDER NOTE - ATTENDING CONTRIBUTION TO CARE
58F presenting meeting SIRS criteria for SOB / cough, febrile here, has asthma, mild expiratory wheeze, coarse breath sounds right side, start empiric tx, check labs / cxr, follow up studies, reassess, dispo.

## 2023-09-11 NOTE — ED PROVIDER NOTE - OBJECTIVE STATEMENT
59yo F PMHx alcohol use dx, sarcoidosis, COPD, Asthma, 2 TIAs, angina, IBS-D, hx heart cath, presents w/ 3-4 days of productive cough, SOB, diarrhea, and subjective fever to 100.4F. 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 The cough is productive for yellow-green sputum, she is having CP, and increasing diarrhea. Pt denies HA, neck pain, abd pain, N/V, dysuria, hematuria, denies travel. 57yo F PMHx alcohol use dx, sarcoidosis, COPD, Asthma, 2 TIAs, angina, IBS-D, hx heart cath, presents w/ 3-4 days of productive cough, SOB, diarrhea, and subjective fever to 100.4F. 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 The cough is productive for yellow-green sputum, she is having CP, and increasing diarrhea. Pt denies HA, neck pain, abd pain, N/V, dysuria, hematuria, denies travel.

## 2023-09-11 NOTE — H&P ADULT - NSICDXPASTSURGICALHX_GEN_ALL_CORE_FT
PAST SURGICAL HISTORY:  H/O  section     History of excision of pilonidal cyst     History of left heart catheterization     History of lung biopsy     S/P tonsillectomy

## 2023-09-11 NOTE — PATIENT PROFILE ADULT - FUNCTIONAL ASSESSMENT - BASIC MOBILITY 3.
Patient /family informed of returning labs .Awaiting remaining and NP will be back to review.   4 = No assist / stand by assistance

## 2023-09-11 NOTE — PATIENT PROFILE ADULT - LEGAL HELP
What Type Of Note Output Would You Prefer (Optional)?: Standard Output How Severe Is Your Rash?: mild Is This A New Presentation, Or A Follow-Up?: Rash Additional History: Patient states the previous dermatologist and his former pcp prescribed Mupirocin, patient has been using it but with no relief. Patient also has been using Neosporin, but he does not use a moisturizer. Patient states rash itches at night only.  Patient’s former pcp did a biopsy. no

## 2023-09-12 LAB
A1C WITH ESTIMATED AVERAGE GLUCOSE RESULT: 5.9 % — HIGH (ref 4–5.6)
ALBUMIN SERPL ELPH-MCNC: 3.5 G/DL — SIGNIFICANT CHANGE UP (ref 3.3–5.2)
ALP SERPL-CCNC: 52 U/L — SIGNIFICANT CHANGE UP (ref 40–120)
ALT FLD-CCNC: 15 U/L — SIGNIFICANT CHANGE UP
ANION GAP SERPL CALC-SCNC: 12 MMOL/L — SIGNIFICANT CHANGE UP (ref 5–17)
AST SERPL-CCNC: 17 U/L — SIGNIFICANT CHANGE UP
BASOPHILS # BLD AUTO: 0.02 K/UL — SIGNIFICANT CHANGE UP (ref 0–0.2)
BASOPHILS NFR BLD AUTO: 0.2 % — SIGNIFICANT CHANGE UP (ref 0–2)
BILIRUB SERPL-MCNC: 0.3 MG/DL — LOW (ref 0.4–2)
BUN SERPL-MCNC: 12.2 MG/DL — SIGNIFICANT CHANGE UP (ref 8–20)
CALCIUM SERPL-MCNC: 9.4 MG/DL — SIGNIFICANT CHANGE UP (ref 8.4–10.5)
CHLORIDE SERPL-SCNC: 104 MMOL/L — SIGNIFICANT CHANGE UP (ref 96–108)
CO2 SERPL-SCNC: 21 MMOL/L — LOW (ref 22–29)
CREAT SERPL-MCNC: 0.51 MG/DL — SIGNIFICANT CHANGE UP (ref 0.5–1.3)
EGFR: 109 ML/MIN/1.73M2 — SIGNIFICANT CHANGE UP
EOSINOPHIL # BLD AUTO: 0 K/UL — SIGNIFICANT CHANGE UP (ref 0–0.5)
EOSINOPHIL NFR BLD AUTO: 0 % — SIGNIFICANT CHANGE UP (ref 0–6)
ESTIMATED AVERAGE GLUCOSE: 123 MG/DL — HIGH (ref 68–114)
GLUCOSE BLDC GLUCOMTR-MCNC: 152 MG/DL — HIGH (ref 70–99)
GLUCOSE BLDC GLUCOMTR-MCNC: 153 MG/DL — HIGH (ref 70–99)
GLUCOSE BLDC GLUCOMTR-MCNC: 170 MG/DL — HIGH (ref 70–99)
GLUCOSE BLDC GLUCOMTR-MCNC: 217 MG/DL — HIGH (ref 70–99)
GLUCOSE SERPL-MCNC: 137 MG/DL — HIGH (ref 70–99)
HCT VFR BLD CALC: 39.3 % — SIGNIFICANT CHANGE UP (ref 34.5–45)
HGB BLD-MCNC: 13.2 G/DL — SIGNIFICANT CHANGE UP (ref 11.5–15.5)
IMM GRANULOCYTES NFR BLD AUTO: 0.6 % — SIGNIFICANT CHANGE UP (ref 0–0.9)
LYMPHOCYTES # BLD AUTO: 19.2 % — SIGNIFICANT CHANGE UP (ref 13–44)
LYMPHOCYTES # BLD AUTO: 2.1 K/UL — SIGNIFICANT CHANGE UP (ref 1–3.3)
MCHC RBC-ENTMCNC: 32.3 PG — SIGNIFICANT CHANGE UP (ref 27–34)
MCHC RBC-ENTMCNC: 33.6 GM/DL — SIGNIFICANT CHANGE UP (ref 32–36)
MCV RBC AUTO: 96.1 FL — SIGNIFICANT CHANGE UP (ref 80–100)
MONOCYTES # BLD AUTO: 0.92 K/UL — HIGH (ref 0–0.9)
MONOCYTES NFR BLD AUTO: 8.4 % — SIGNIFICANT CHANGE UP (ref 2–14)
NEUTROPHILS # BLD AUTO: 7.8 K/UL — HIGH (ref 1.8–7.4)
NEUTROPHILS NFR BLD AUTO: 71.6 % — SIGNIFICANT CHANGE UP (ref 43–77)
PLATELET # BLD AUTO: 177 K/UL — SIGNIFICANT CHANGE UP (ref 150–400)
POTASSIUM SERPL-MCNC: 4.2 MMOL/L — SIGNIFICANT CHANGE UP (ref 3.5–5.3)
POTASSIUM SERPL-SCNC: 4.2 MMOL/L — SIGNIFICANT CHANGE UP (ref 3.5–5.3)
PROT SERPL-MCNC: 6.3 G/DL — LOW (ref 6.6–8.7)
RBC # BLD: 4.09 M/UL — SIGNIFICANT CHANGE UP (ref 3.8–5.2)
RBC # FLD: 13.5 % — SIGNIFICANT CHANGE UP (ref 10.3–14.5)
SODIUM SERPL-SCNC: 137 MMOL/L — SIGNIFICANT CHANGE UP (ref 135–145)
WBC # BLD: 10.91 K/UL — HIGH (ref 3.8–10.5)
WBC # FLD AUTO: 10.91 K/UL — HIGH (ref 3.8–10.5)

## 2023-09-12 PROCEDURE — 99233 SBSQ HOSP IP/OBS HIGH 50: CPT

## 2023-09-12 RX ADMIN — ENOXAPARIN SODIUM 40 MILLIGRAM(S): 100 INJECTION SUBCUTANEOUS at 05:29

## 2023-09-12 RX ADMIN — ACAMPROSATE CALCIUM 333 MILLIGRAM(S): 333 TABLET, DELAYED RELEASE ORAL at 21:38

## 2023-09-12 RX ADMIN — ENOXAPARIN SODIUM 40 MILLIGRAM(S): 100 INJECTION SUBCUTANEOUS at 18:06

## 2023-09-12 RX ADMIN — ACAMPROSATE CALCIUM 333 MILLIGRAM(S): 333 TABLET, DELAYED RELEASE ORAL at 05:29

## 2023-09-12 RX ADMIN — Medication 60 MILLIGRAM(S): at 14:34

## 2023-09-12 RX ADMIN — NORTRIPTYLINE HYDROCHLORIDE 50 MILLIGRAM(S): 10 CAPSULE ORAL at 21:38

## 2023-09-12 RX ADMIN — Medication 650 MILLIGRAM(S): at 03:22

## 2023-09-12 RX ADMIN — Medication 650 MILLIGRAM(S): at 02:52

## 2023-09-12 RX ADMIN — FAMOTIDINE 20 MILLIGRAM(S): 10 INJECTION INTRAVENOUS at 05:29

## 2023-09-12 RX ADMIN — ACAMPROSATE CALCIUM 333 MILLIGRAM(S): 333 TABLET, DELAYED RELEASE ORAL at 14:34

## 2023-09-12 RX ADMIN — ALBUTEROL 2 PUFF(S): 90 AEROSOL, METERED ORAL at 20:09

## 2023-09-12 RX ADMIN — AZITHROMYCIN 255 MILLIGRAM(S): 500 TABLET, FILM COATED ORAL at 10:21

## 2023-09-12 RX ADMIN — Medication 20 MILLIGRAM(S): at 05:36

## 2023-09-12 RX ADMIN — Medication 25 MILLIGRAM(S): at 05:29

## 2023-09-12 RX ADMIN — Medication 2 MILLIGRAM(S): at 18:06

## 2023-09-12 RX ADMIN — FAMOTIDINE 20 MILLIGRAM(S): 10 INJECTION INTRAVENOUS at 18:06

## 2023-09-12 RX ADMIN — Medication 81 MILLIGRAM(S): at 14:34

## 2023-09-12 RX ADMIN — Medication 20 MILLIGRAM(S): at 19:00

## 2023-09-12 RX ADMIN — Medication 40 MILLIGRAM(S): at 18:06

## 2023-09-12 RX ADMIN — Medication 2 MILLIGRAM(S): at 05:29

## 2023-09-12 RX ADMIN — CEFTRIAXONE 1000 MILLIGRAM(S): 500 INJECTION, POWDER, FOR SOLUTION INTRAMUSCULAR; INTRAVENOUS at 14:37

## 2023-09-12 RX ADMIN — ATORVASTATIN CALCIUM 40 MILLIGRAM(S): 80 TABLET, FILM COATED ORAL at 21:39

## 2023-09-12 RX ADMIN — MONTELUKAST 10 MILLIGRAM(S): 4 TABLET, CHEWABLE ORAL at 21:39

## 2023-09-12 RX ADMIN — CLOPIDOGREL BISULFATE 75 MILLIGRAM(S): 75 TABLET, FILM COATED ORAL at 14:34

## 2023-09-12 RX ADMIN — Medication 40 MILLIGRAM(S): at 05:29

## 2023-09-12 NOTE — PROGRESS NOTE ADULT - ASSESSMENT
58 yr old female with obesity, hypertension, Asthma/COPD, smoker, alcohol abuse, TIA, depression presents with 3 days of productive cough, chills and fever, not relieved with inhalers at home. Reported sick contacts at work. Smoker >35 yrs, no pets, not on home oxygen. Febrile in ED with leucocytosis, was admitted for COPD exacerbation. IV steroids and antibiotics were initiated.     1. COPD exacerbation:  Continue IV steroids, taper to q12 hrs  Duoneb prn  Continue Montelukast  follow up with urine Legionella  follow up cultures    2. Hypertension, TIA:  Continue DAPT, statin  Continue Metoprolol    3. Depression:  Continue Fluoxetine    4. Alcohol abuse:  Has been sober for 60 days    5. DVT ppx:  Lovenox    Discussed with patient, anticipate discharge in 24 hrs if stable.

## 2023-09-12 NOTE — PROGRESS NOTE ADULT - SUBJECTIVE AND OBJECTIVE BOX
INTERVAL HPI/OVERNIGHT EVENTS:    CC: COPD exacerbation, TIA, alcohol abuse    No overnight events  feels better  still has cough and mild wheezing.     Vital Signs Last 24 Hrs  T(C): 36.4 (12 Sep 2023 07:52), Max: 37.5 (11 Sep 2023 19:35)  T(F): 97.6 (12 Sep 2023 07:52), Max: 99.5 (11 Sep 2023 19:35)  HR: 64 (12 Sep 2023 07:52) (64 - 91)  BP: 156/88 (12 Sep 2023 07:52) (139/74 - 175/91)  BP(mean): --  RR: 18 (12 Sep 2023 07:52) (18 - 18)  SpO2: 96% (12 Sep 2023 07:52) (96% - 99%)    Parameters below as of 12 Sep 2023 07:52  Patient On (Oxygen Delivery Method): nasal cannula  O2 Flow (L/min): 2      PHYSICAL EXAM:    GENERAL: alert, not in distress, obese  CHEST/LUNG: b/l air entry, mild wheezing  HEART: reg  ABDOMEN: soft, bs+  EXTREMITIES:  no edema, tenderness    MEDICATIONS  (STANDING):  acamprosate 333 milliGRAM(s) Oral three times a day  aspirin enteric coated 81 milliGRAM(s) Oral daily  atorvastatin 40 milliGRAM(s) Oral at bedtime  azithromycin  IVPB 500 milliGRAM(s) IV Intermittent every 24 hours  cefTRIAXone Injectable. 1000 milliGRAM(s) IV Push every 24 hours  clopidogrel Tablet 75 milliGRAM(s) Oral daily  dextrose 5%. 1000 milliLiter(s) (50 mL/Hr) IV Continuous <Continuous>  dextrose 50% Injectable 25 Gram(s) IV Push once  dicyclomine 20 milliGRAM(s) Oral <User Schedule>  enoxaparin Injectable 40 milliGRAM(s) SubCutaneous every 12 hours  famotidine    Tablet 20 milliGRAM(s) Oral two times a day  FLUoxetine 60 milliGRAM(s) Oral daily  glucagon  Injectable 1 milliGRAM(s) IntraMuscular once  insulin lispro (ADMELOG) corrective regimen sliding scale   SubCutaneous three times a day before meals  loperamide 2 milliGRAM(s) Oral two times a day  methylPREDNISolone sodium succinate Injectable 40 milliGRAM(s) IV Push every 12 hours  metoprolol succinate ER 25 milliGRAM(s) Oral daily  montelukast 10 milliGRAM(s) Oral at bedtime  nortriptyline 50 milliGRAM(s) Oral at bedtime    MEDICATIONS  (PRN):  acetaminophen     Tablet .. 650 milliGRAM(s) Oral every 6 hours PRN Temp greater or equal to 38C (100.4F), Mild Pain (1 - 3)  albuterol    90 MICROgram(s) HFA Inhaler 2 Puff(s) Inhalation every 6 hours PRN for shortness of breath and/or wheezing  dextrose Oral Gel 15 Gram(s) Oral once PRN Blood Glucose LESS THAN 70 milliGRAM(s)/deciliter  meclizine 25 milliGRAM(s) Oral every 8 hours PRN for dizziness      Allergies    dust (Short breath; Eye Irritation)  naltrexone (Short breath)  Augmentin (Rash; Vomiting; Diarrhea)  Celery (Rash)  penicillins (Rash)  Levaquin (Rash; Short breath)    Intolerances          LABS:                          13.2   10.91 )-----------( 177      ( 12 Sep 2023 03:56 )             39.3     09-12    137  |  104  |  12.2  ----------------------------<  137<H>  4.2   |  21.0<L>  |  0.51    Ca    9.4      12 Sep 2023 03:56    TPro  6.3<L>  /  Alb  3.5  /  TBili  0.3<L>  /  DBili  x   /  AST  17  /  ALT  15  /  AlkPhos  52  09-12    PT/INR - ( 11 Sep 2023 09:39 )   PT: 10.4 sec;   INR: 0.94 ratio         PTT - ( 11 Sep 2023 09:39 )  PTT:31.0 sec  Urinalysis Basic - ( 12 Sep 2023 03:56 )    Color: x / Appearance: x / SG: x / pH: x  Gluc: 137 mg/dL / Ketone: x  / Bili: x / Urobili: x   Blood: x / Protein: x / Nitrite: x   Leuk Esterase: x / RBC: x / WBC x   Sq Epi: x / Non Sq Epi: x / Bacteria: x        RADIOLOGY & ADDITIONAL TESTS:

## 2023-09-13 ENCOUNTER — TRANSCRIPTION ENCOUNTER (OUTPATIENT)
Age: 58
End: 2023-09-13

## 2023-09-13 VITALS
TEMPERATURE: 98 F | DIASTOLIC BLOOD PRESSURE: 78 MMHG | RESPIRATION RATE: 18 BRPM | OXYGEN SATURATION: 98 % | SYSTOLIC BLOOD PRESSURE: 130 MMHG | HEART RATE: 58 BPM

## 2023-09-13 LAB
CULTURE RESULTS: SIGNIFICANT CHANGE UP
GLUCOSE BLDC GLUCOMTR-MCNC: 152 MG/DL — HIGH (ref 70–99)
GLUCOSE BLDC GLUCOMTR-MCNC: 156 MG/DL — HIGH (ref 70–99)
SPECIMEN SOURCE: SIGNIFICANT CHANGE UP

## 2023-09-13 PROCEDURE — 85610 PROTHROMBIN TIME: CPT

## 2023-09-13 PROCEDURE — 81001 URINALYSIS AUTO W/SCOPE: CPT

## 2023-09-13 PROCEDURE — 82330 ASSAY OF CALCIUM: CPT

## 2023-09-13 PROCEDURE — 99285 EMERGENCY DEPT VISIT HI MDM: CPT | Mod: 25

## 2023-09-13 PROCEDURE — 87040 BLOOD CULTURE FOR BACTERIA: CPT

## 2023-09-13 PROCEDURE — 85014 HEMATOCRIT: CPT

## 2023-09-13 PROCEDURE — 99239 HOSP IP/OBS DSCHRG MGMT >30: CPT

## 2023-09-13 PROCEDURE — 83605 ASSAY OF LACTIC ACID: CPT

## 2023-09-13 PROCEDURE — 80053 COMPREHEN METABOLIC PANEL: CPT

## 2023-09-13 PROCEDURE — 96375 TX/PRO/DX INJ NEW DRUG ADDON: CPT

## 2023-09-13 PROCEDURE — 36415 COLL VENOUS BLD VENIPUNCTURE: CPT

## 2023-09-13 PROCEDURE — 71046 X-RAY EXAM CHEST 2 VIEWS: CPT

## 2023-09-13 PROCEDURE — 84132 ASSAY OF SERUM POTASSIUM: CPT

## 2023-09-13 PROCEDURE — 85730 THROMBOPLASTIN TIME PARTIAL: CPT

## 2023-09-13 PROCEDURE — 84295 ASSAY OF SERUM SODIUM: CPT

## 2023-09-13 PROCEDURE — 94640 AIRWAY INHALATION TREATMENT: CPT

## 2023-09-13 PROCEDURE — 82803 BLOOD GASES ANY COMBINATION: CPT

## 2023-09-13 PROCEDURE — 83036 HEMOGLOBIN GLYCOSYLATED A1C: CPT

## 2023-09-13 PROCEDURE — 96374 THER/PROPH/DIAG INJ IV PUSH: CPT

## 2023-09-13 PROCEDURE — 93005 ELECTROCARDIOGRAM TRACING: CPT

## 2023-09-13 PROCEDURE — 82962 GLUCOSE BLOOD TEST: CPT

## 2023-09-13 PROCEDURE — 85018 HEMOGLOBIN: CPT

## 2023-09-13 PROCEDURE — 84484 ASSAY OF TROPONIN QUANT: CPT

## 2023-09-13 PROCEDURE — 0225U NFCT DS DNA&RNA 21 SARSCOV2: CPT

## 2023-09-13 PROCEDURE — 87086 URINE CULTURE/COLONY COUNT: CPT

## 2023-09-13 PROCEDURE — 82435 ASSAY OF BLOOD CHLORIDE: CPT

## 2023-09-13 PROCEDURE — 85025 COMPLETE CBC W/AUTO DIFF WBC: CPT

## 2023-09-13 PROCEDURE — 82947 ASSAY GLUCOSE BLOOD QUANT: CPT

## 2023-09-13 RX ORDER — AZITHROMYCIN 500 MG/1
1 TABLET, FILM COATED ORAL
Qty: 3 | Refills: 0
Start: 2023-09-13 | End: 2023-09-15

## 2023-09-13 RX ORDER — FLUTICASONE PROPIONATE AND SALMETEROL 50; 250 UG/1; UG/1
1 POWDER ORAL; RESPIRATORY (INHALATION)
Qty: 60 | Refills: 0
Start: 2023-09-13 | End: 2023-10-12

## 2023-09-13 RX ORDER — IPRATROPIUM/ALBUTEROL SULFATE 18-103MCG
3 AEROSOL WITH ADAPTER (GRAM) INHALATION
Qty: 30 | Refills: 0
Start: 2023-09-13 | End: 2023-10-12

## 2023-09-13 RX ADMIN — ENOXAPARIN SODIUM 40 MILLIGRAM(S): 100 INJECTION SUBCUTANEOUS at 05:05

## 2023-09-13 RX ADMIN — Medication 20 MILLIGRAM(S): at 05:04

## 2023-09-13 RX ADMIN — CEFTRIAXONE 1000 MILLIGRAM(S): 500 INJECTION, POWDER, FOR SOLUTION INTRAMUSCULAR; INTRAVENOUS at 15:18

## 2023-09-13 RX ADMIN — AZITHROMYCIN 255 MILLIGRAM(S): 500 TABLET, FILM COATED ORAL at 10:53

## 2023-09-13 RX ADMIN — ACAMPROSATE CALCIUM 333 MILLIGRAM(S): 333 TABLET, DELAYED RELEASE ORAL at 05:04

## 2023-09-13 RX ADMIN — Medication 40 MILLIGRAM(S): at 05:03

## 2023-09-13 RX ADMIN — FAMOTIDINE 20 MILLIGRAM(S): 10 INJECTION INTRAVENOUS at 05:04

## 2023-09-13 RX ADMIN — Medication 25 MILLIGRAM(S): at 05:04

## 2023-09-13 RX ADMIN — Medication 2 MILLIGRAM(S): at 05:04

## 2023-09-13 NOTE — DISCHARGE NOTE PROVIDER - CARE PROVIDER_API CALL
Ryley Snow  Critical Care Medicine  [address] [facility], [address] [place] [address]  Phone: [phone]  Fax: [phone]  Follow Up Time: 1 month   Ryley Snow  Critical Care Medicine  [address] [facility], [address] [place] [id number]  Phone: [phone]  Fax: [phone]  Follow Up Time: 1 month   Ryley Snow  Critical Care Medicine  [address] [facility], [address] [place] [id number]  Phone: [phone]  Fax: [phone]  Follow Up Time: 1 month

## 2023-09-13 NOTE — DISCHARGE NOTE NURSING/CASE MANAGEMENT/SOCIAL WORK - NSDCPETBCESMAN_GEN_ALL_CORE
majo message:    Famotidine is on back order, per Dr. Gomes verbal prilosec 20 mg qd  Switch back as soon as available.   
If you are a smoker, it is important for your health to stop smoking. Please be aware that second hand smoke is also harmful.

## 2023-09-13 NOTE — DISCHARGE NOTE PROVIDER - DETAILS OF MALNUTRITION DIAGNOSIS/DIAGNOSES
This patient has been assessed with a concern for Malnutrition and was treated during this hospitalization for the following Nutrition diagnosis/diagnoses:     -  09/13/2023: Moderate protein-calorie malnutrition

## 2023-09-13 NOTE — DISCHARGE NOTE PROVIDER - NSDCMRMEDTOKEN_GEN_ALL_CORE_FT
Advair Diskus 250 mcg-50 mcg inhalation powder: 1 inhaled 2 times a day  aspirin 81 mg oral tablet: 1 tab(s) orally once a day  azithromycin 500 mg oral tablet: 1 tab(s) orally once a day  Bentyl 20 mg oral tablet: 1 tab(s) orally 2 times a day  FLUoxetine 60 mg oral tablet: 1 tab(s) orally once a day  Imodium 2 mg oral capsule: 1 cap(s) orally 2 times a day  ipratropium-albuterol 0.5 mg-2.5 mg/3 mL inhalation solution: 3 milliliter(s) by nebulizer 4 times a day as needed for shortness of breath  Lipitor 40 mg oral tablet: 1 tab(s) orally once a day (at bedtime)  meclizine 25 mg oral tablet: 1 orally every 8 hours as needed for  dizziness 1 tab every 8 hours as needed  metoprolol succinate 25 mg oral capsule, extended release: 1 caplet(s) orally twice  nebulizer: COPD  nortriptyline 50 mg oral capsule: 1 cap(s) orally once a day (at bedtime)  Pepcid 20 mg oral tablet: 1 tab(s) orally 2 times a day  Plavix 75 mg oral tablet: 1 tab(s) orally once a day  predniSONE 20 mg oral tablet: 2 tab(s) orally once a day start tomorrow am  Singulair 10 mg oral tablet: 1 tab(s) orally once a day (at bedtime)

## 2023-09-13 NOTE — DIETITIAN INITIAL EVALUATION ADULT - ETIOLOGY
Related to inadequate protein energy intake with decreased appetite in setting of pain, now with COPD exacerbation

## 2023-09-13 NOTE — DIETITIAN INITIAL EVALUATION ADULT - PERTINENT LABORATORY DATA
09-12    137  |  104  |  12.2  ----------------------------<  137<H>  4.2   |  21.0<L>  |  0.51    Ca    9.4      12 Sep 2023 03:56    TPro  6.3<L>  /  Alb  3.5  /  TBili  0.3<L>  /  DBili  x   /  AST  17  /  ALT  15  /  AlkPhos  52  09-12  POCT Blood Glucose.: 152 mg/dL (09-13-23 @ 08:45)  A1C with Estimated Average Glucose Result: 5.9 % (09-12-23 @ 03:56)

## 2023-09-13 NOTE — DISCHARGE NOTE PROVIDER - ATTENDING DISCHARGE PHYSICAL EXAMINATION:
pt is seen , chart reviewed 'Pt is feeling better 'no SOB   no cough   chart reviewed     Vital Signs Last 24 Hrs  T(C): 36.8 (13 Sep 2023 08:05), Max: 37.2 (12 Sep 2023 12:23)  T(F): 98.3 (13 Sep 2023 08:05), Max: 99 (12 Sep 2023 12:23)  HR: 65 (13 Sep 2023 08:05) (59 - 72)  BP: 157/89 (13 Sep 2023 08:05) (128/74 - 157/89)  BP(mean): --  RR: 18 (13 Sep 2023 08:05) (18 - 18)  SpO2: 95% (13 Sep 2023 08:05) (93% - 97%)    Parameters below as of 13 Sep 2023 08:05  Patient On (Oxygen Delivery Method): room air    Lungs : No wheezing good air entry   Heart " regular s1 /s2   Abd soft no tenderness , BS +   ext No edema

## 2023-09-13 NOTE — DISCHARGE NOTE NURSING/CASE MANAGEMENT/SOCIAL WORK - PATIENT PORTAL LINK FT
You can access the FollowMyHealth Patient Portal offered by [facility] by registering at the following website: [email or web address]. By joining [employer]’s FollowMyHealth portal, you will also be able to view your health information using other applications (apps) compatible with our system. You can access the FollowMyHealth Patient Portal offered by [facility] by registering at the following website: [email or web address]. By joining [employer]’s FollowMyHealth portal, you will also be able to view your health information using other applications (apps) compatible with our system. You can access the FollowMyHealth Patient Portal offered by [facility] by registering at the following website: [email or web address]. By joining [employer]’s FollowMyHealth portal, you will also be able to view your health information using other applications (apps) compatible with our system.

## 2023-09-13 NOTE — DISCHARGE NOTE NURSING/CASE MANAGEMENT/SOCIAL WORK - NSDCPEFALRISK_GEN_ALL_CORE
For information on Fall & Injury Prevention, visit: [email or web address]-[email or web address]-mobility OR  [email or web address]-[email or web address]-injury OR  https://www.cdc.gov/steadi/patient.html For information on Fall & Injury Prevention, visit: [email or web address].[email or web address]-[email or web address]-mobility OR  [email or web address]://www.[email or web address]/[email or web address]-[email or web address]-[email or web address]-injury OR  https://www.cdc.gov/steadi/patient.html For information on Fall & Injury Prevention, visit: https://www.[facility]/news/fall-prevention-protects-[email or web address]-maintains-health-and-mobility OR  https://www.[facility]/news/fall-prevention-tips-to-avoid-injury OR  https://www.cdc.gov/steadi/patient.html

## 2023-09-13 NOTE — CHART NOTE - NSCHARTNOTEFT_GEN_A_CORE
SW Note: Pt is medically cleared for discharge per MD. Pt is from [facility]. Per  Annabella, pt is all set to return. Worker faxed over discharge summary to [facility] (Fx: [phone]). RN and patient made aware that pt is stable for discharge. Pt reports she will call her friend for a ride home once she receives her discharge papers. No further SW needs identified at this time. SW Note: Pt is medically cleared for discharge per MD. Pt is from [facility]. Per  Annabella, pt is all set to return. Worker faxed over discharge summary to [facility] (Fx: [phone]). RN and patient made aware that pt is stable for discharge. Pt reports she will call her friend for a ride home once she receives her discharge papers. No further SW needs identified at this time. SW Note: Pt is medically cleared for discharge per MD. Pt is from [facility]. Per  Annabella, pt is all set to return. Worker faxed over discharge summary to [facility] (Fx: [phone]). RN and patient made aware that pt is stable for discharge. Pt reports she will call her friend for a ride home once she receives her discharge papers. No further SW needs identified at this time.

## 2023-09-13 NOTE — DISCHARGE NOTE PROVIDER - NSDCCPCAREPLAN_GEN_ALL_CORE_FT
PRINCIPAL DISCHARGE DIAGNOSIS  Diagnosis: COPD exacerbation  Assessment and Plan of Treatment: continue nebulizer as needed, inhaler , follow up with pulmonologist , do not smoke

## 2023-09-13 NOTE — DIETITIAN INITIAL EVALUATION ADULT - MALNUTRITION
"Critical Care Consultation    Date of consult: 3/2/2022    Referring Physician  Bladimir Khan D.O.    Reason for Consultation  Drug overdose    History of Presenting Illness  19 y.o. female with history of depression and self-harm who presented 3/2/2022 with who after having a fight with friends , called EMS after she reportedly took \"75\" total pills of of three different medications prazosin propranolol and Abilify. EMS did find empty bottles of propranolol and omeprazole and a bottle of prazosin with 1 pill missing. activated charcoal was given, no other interventions had been given and patient's map remained greater than 65, heart rate remained greater than 50 was in the mid sixties when saw patient, after 2 hours of observation in 3 hours after ingestion.    Psych hold had been initiated    Patient was somnolent but would wake up answer per questions intermittently was protecting her airway.     Poison control was called recommending, activated charcoal every 2 hour EKGs for at least 10 hours to ensure QRS not greater than 120, if becomes greater than 120 sodium bicarb and repeat EKG after 15 minutes    Tylenol aspirin levels normal all of patient's labs are normal    Code Status  Full Code    Review of Systems  ROS patient did not want to talk much says she already told people everything. Admits to suicidal ideation, no headache no trauma no vomiting no dizziness    Past Medical History   has a past medical history of Depression, Otitis, and Strep throat.    Surgical History   has no past surgical history on file.    Family History  family history is not on file.    Social History   reports that she has been smoking cigarettes. She has been smoking about 0.50 packs per day. She has never used smokeless tobacco. She reports current alcohol use. She reports current drug use.    Medications  Home Medications     Reviewed by Aiden Sales (Pharmacy Tech) on 03/02/22 at 0608  Med List Status: Unable to " Moderate (chronic)  Obtain   Medication Last Dose Status   ARIPiprazole (ABILIFY) 10 MG Tab  Active   fluoxetine (PROZAC) 40 MG capsule  Active   hydrOXYzine HCl (ATARAX) 25 MG Tab  Active   lamoTRIgine (LAMICTAL) 100 MG Tab  Active   lamoTRIgine (LAMICTAL) 25 MG Tab  Active   prazosin (MINIPRESS) 2 MG Cap  Active              Current Facility-Administered Medications   Medication Dose Route Frequency Provider Last Rate Last Admin   • fluoxetine (PROZAC) CAPS 40 mg  40 mg Oral DAILY Bladimir Khan D.O.       • lamoTRIgine (LAMICTAL) tablet 100 mg  100 mg Oral DAILY Bladimir Khan D.O.       • lamoTRIgine (LAMICTAL) tablet 25 mg  25 mg Oral DAILY Bladimir hKan D.O.       • senna-docusate (PERICOLACE or SENOKOT S) 8.6-50 MG per tablet 2 Tablet  2 Tablet Oral BID Bladimir Khan D.O.        And   • polyethylene glycol/lytes (MIRALAX) PACKET 1 Packet  1 Packet Oral QDAY PRN Bladimir Khan D.O.        And   • magnesium hydroxide (MILK OF MAGNESIA) suspension 30 mL  30 mL Oral QDAY PRN Bladimir Khan, D.O.        And   • bisacodyl (DULCOLAX) suppository 10 mg  10 mg Rectal QDAY PRN Bladimir Khan D.O.       • lactated ringers infusion   Intravenous Continuous Bladimir Khan D.O.       • enoxaparin (LOVENOX) inj 40 mg  40 mg Subcutaneous DAILY Bladimir Khan D.O.       • acetaminophen (Tylenol) tablet 650 mg  650 mg Oral Q6HRS PRN Bladimir Khan D.O.       • labetalol (NORMODYNE/TRANDATE) injection 10 mg  10 mg Intravenous Q4HRS PRN Bladimir Khan D.O.       • ondansetron (ZOFRAN) syringe/vial injection 4 mg  4 mg Intravenous Q4HRS PRN Bladimir Khan D.O.       • ondansetron (ZOFRAN ODT) dispertab 4 mg  4 mg Oral Q4HRS PRN Bladimir Khan D.O.       • promethazine (PHENERGAN) tablet 12.5-25 mg  12.5-25 mg Oral Q4HRS PRN NICHOLE Tim.O.       • promethazine (PHENERGAN) suppository 12.5-25 mg  12.5-25 mg Rectal Q4HRS PRN NICHOLE Tim.O.       • prochlorperazine (COMPAZINE)  injection 5-10 mg  5-10 mg Intravenous Q4HRS PRN Bladimir Khan D.O.           Allergies  No Known Allergies    Vital Signs last 24 hours  Temp:  [36.7 °C (98.1 °F)] 36.7 °C (98.1 °F)  Pulse:  [54-68] 54  Resp:  [14-24] 17  BP: ()/(55-63) 93/56  SpO2:  [93 %-97 %] 95 %    Physical Exam  Physical Exam   Patient with depressed affect, curled in bed not wanting to talk.   No signs of trauma  Slow cap refill dry mucous membranes  Regular rhythm, heart rate 50 to 60's  Lungs clear  No clonus    Fluids    Intake/Output Summary (Last 24 hours) at 3/2/2022 0631  Last data filed at 3/2/2022 0427  Gross per 24 hour   Intake 1000 ml   Output --   Net 1000 ml       Laboratory  Recent Results (from the past 48 hour(s))   EKG (NOW)    Collection Time: 22  3:35 AM   Result Value Ref Range    Report       [facility]    Test Date:  2022  Pt Name:    KATARINA SPEARS               Department: ER  MRN:        [id number]                      Room:       [facility]  Gender:     Female                       Technician: [id number]  :        [id number]                   Requested By:RICHARD MOSLEY  Order #:    [id number]                    Reading MD:    Measurements  Intervals                                Axis  Rate:       69                           P:          43  HI:         180                          QRS:        87  QRSD:       108                          T:          24  QT:         396  QTc:        425    Interpretive Statements  SINUS RHYTHM  BORDERLINE INTRAVENTRICULAR CONDUCTION DELAY  Compared to ECG 2019 02:10:54  ST (T wave) deviation no longer present  Possible ischemia no longer present     Blood Alcohol    Collection Time: 22  3:38 AM   Result Value Ref Range    Diagnostic Alcohol <10.1 0.0 - 10.0 mg/dL   CBC WITH DIFFERENTIAL    Collection Time: 22  3:38 AM   Result Value Ref Range    WBC 9.9 4.8 - 10.8 K/uL    RBC 4.66 4.20 - 5.40 M/uL    Hemoglobin 14.1  12.0 - 16.0 g/dL    Hematocrit 41.4 37.0 - 47.0 %    MCV 88.8 81.4 - 97.8 fL    MCH 30.3 27.0 - 33.0 pg    MCHC 34.1 33.6 - 35.0 g/dL    RDW 41.7 35.9 - 50.0 fL    Platelet Count 318 164 - 446 K/uL    MPV 10.2 9.0 - 12.9 fL    Neutrophils-Polys 68.40 44.00 - 72.00 %    Lymphocytes 28.10 22.00 - 41.00 %    Monocytes 2.60 0.00 - 13.40 %    Eosinophils 0.90 0.00 - 6.90 %    Basophils 0.00 0.00 - 1.80 %    Nucleated RBC 0.00 /100 WBC    Neutrophils (Absolute) 6.77 2.00 - 7.15 K/uL    Lymphs (Absolute) 2.78 1.00 - 4.80 K/uL    Monos (Absolute) 0.26 0.00 - 0.85 K/uL    Eos (Absolute) 0.09 0.00 - 0.51 K/uL    Baso (Absolute) 0.00 0.00 - 0.12 K/uL    NRBC (Absolute) 0.00 K/uL   COMP METABOLIC PANEL    Collection Time: 03/02/22  3:38 AM   Result Value Ref Range    Sodium 137 135 - 145 mmol/L    Potassium 3.7 3.6 - 5.5 mmol/L    Chloride 104 96 - 112 mmol/L    Co2 19 (L) 20 - 33 mmol/L    Anion Gap 14.0 7.0 - 16.0    Glucose 105 (H) 65 - 99 mg/dL    Bun 6 (L) 8 - 22 mg/dL    Creatinine 0.58 0.50 - 1.40 mg/dL    Calcium 9.1 8.5 - 10.5 mg/dL    AST(SGOT) 14 12 - 45 U/L    ALT(SGPT) 8 2 - 50 U/L    Alkaline Phosphatase 97 45 - 125 U/L    Total Bilirubin 0.5 0.1 - 1.2 mg/dL    Albumin 4.2 3.2 - 4.9 g/dL    Total Protein 7.5 6.0 - 8.2 g/dL    Globulin 3.3 1.9 - 3.5 g/dL    A-G Ratio 1.3 g/dL   HCG QUAL SERUM    Collection Time: 03/02/22  3:38 AM   Result Value Ref Range    Beta-Hcg Qualitative Serum Negative Negative   Acetaminophen Level    Collection Time: 03/02/22  3:38 AM   Result Value Ref Range    Acetaminophen -Tylenol <5.0 (L) 10.0 - 30.0 ug/mL   SALICYLATE LEVEL    Collection Time: 03/02/22  3:38 AM   Result Value Ref Range    Salicylates, Quant. <1.0 (L) 15.0 - 25.0 mg/dL   ESTIMATED GFR    Collection Time: 03/02/22  3:38 AM   Result Value Ref Range    GFR If African American >60 >60 mL/min/1.73 m 2    GFR If Non African American >60 >60 mL/min/1.73 m 2   DIFFERENTIAL MANUAL    Collection Time: 03/02/22  3:38 AM   Result  Value Ref Range    Manual Diff Status PERFORMED    PERIPHERAL SMEAR REVIEW    Collection Time: 03/02/22  3:38 AM   Result Value Ref Range    Peripheral Smear Review see below    PLATELET ESTIMATE    Collection Time: 03/02/22  3:38 AM   Result Value Ref Range    Plt Estimation Normal    MORPHOLOGY    Collection Time: 03/02/22  3:38 AM   Result Value Ref Range    RBC Morphology Normal    MAGNESIUM    Collection Time: 03/02/22  3:38 AM   Result Value Ref Range    Magnesium 1.9 1.5 - 2.5 mg/dL       Imaging  No orders to display       Assessment/Plan  Toxic ingestion/bradycardia  Patient with reported ingestion of 75 pills of propranolol, Abilify, and prazosin after having a fight with her friends, and calling EMS immediately after.   Mild bradycardia to mid fifties, which when she wakes up goes up to high sixties, with blood pressure with maps remaining above 65 and systolics greater than 95.     Given activated charcoal very soon after initial ingestion.   Awake,  protecting her airway-labs overall normal, slight acidosis (19)     Agree with obtaining lactic acid, and fluid resuscitation.     No significant EKG changes(-previously in 2019 was 102)     At this time discussed with emergency room physician and Dr. Khan [facility] hospitalist feel observation and close monitoring of patient in [facility] is appropriate.     However if patient's vital signs begin to change with worsening bradycardia or hypotension, or EKG changes are seen on every 2 hour EKG, or other concerning findings,  reconsult ICU for possible upgrade.       Total critical care time evaluating patient, reviewing past medical history, and discussing case with hospitalist and ED physician-25 minutes

## 2023-09-13 NOTE — DIETITIAN INITIAL EVALUATION ADULT - ORAL INTAKE PTA/DIET HISTORY
Pt reports having decreased appetite since July when was diagnosed with shingles. Pt endorses 15lb weight loss in this time (2 months). Pt reports following a low sugar diet secondary to history of DM. Pt with poor dentition noted; denies difficulty chewing.

## 2023-09-13 NOTE — DIETITIAN INITIAL EVALUATION ADULT - PERTINENT MEDS FT
MEDICATIONS  (STANDING):  acamprosate 333 milliGRAM(s) Oral three times a day  aspirin enteric coated 81 milliGRAM(s) Oral daily  atorvastatin 40 milliGRAM(s) Oral at bedtime  azithromycin  IVPB 500 milliGRAM(s) IV Intermittent every 24 hours  cefTRIAXone Injectable. 1000 milliGRAM(s) IV Push every 24 hours  clopidogrel Tablet 75 milliGRAM(s) Oral daily  dextrose 5%. 1000 milliLiter(s) (50 mL/Hr) IV Continuous <Continuous>  dextrose 50% Injectable 25 Gram(s) IV Push once  dicyclomine 20 milliGRAM(s) Oral <User Schedule>  enoxaparin Injectable 40 milliGRAM(s) SubCutaneous every 12 hours  famotidine    Tablet 20 milliGRAM(s) Oral two times a day  FLUoxetine 60 milliGRAM(s) Oral daily  glucagon  Injectable 1 milliGRAM(s) IntraMuscular once  insulin lispro (ADMELOG) corrective regimen sliding scale   SubCutaneous three times a day before meals  loperamide 2 milliGRAM(s) Oral two times a day  methylPREDNISolone sodium succinate Injectable 40 milliGRAM(s) IV Push every 12 hours  metoprolol succinate ER 25 milliGRAM(s) Oral daily  montelukast 10 milliGRAM(s) Oral at bedtime  nortriptyline 50 milliGRAM(s) Oral at bedtime    MEDICATIONS  (PRN):  acetaminophen     Tablet .. 650 milliGRAM(s) Oral every 6 hours PRN Temp greater or equal to 38C (100.4F), Mild Pain (1 - 3)  albuterol    90 MICROgram(s) HFA Inhaler 2 Puff(s) Inhalation every 6 hours PRN for shortness of breath and/or wheezing  dextrose Oral Gel 15 Gram(s) Oral once PRN Blood Glucose LESS THAN 70 milliGRAM(s)/deciliter  meclizine 25 milliGRAM(s) Oral every 8 hours PRN for dizziness

## 2023-09-13 NOTE — DIETITIAN INITIAL EVALUATION ADULT - OTHER INFO
Pt is a 58yr old female with obesity, hypertension, Asthma/COPD, smoker, alcohol abuse, TIA, depression presents with 3 days of productive cough, chills and fever, not relieved with inhalers at home. Reported sick contacts at work. Smoker >35 yrs, no pets, not on home oxygen. Febrile in ED with leucocytosis, was admitted for COPD exacerbation. IV steroids and antibiotics were initiated.

## 2023-09-13 NOTE — DISCHARGE NOTE PROVIDER - PROVIDER TOKENS
PROVIDER:[TOKEN:[[id number]],FOLLOWUP:[1 month]] PROVIDER:[TOKEN:[[id number]],FOLLOWUP:[1 month]] PROVIDER:[TOKEN:[[id number]],FOLLOWUP:[1 month]]

## 2023-09-13 NOTE — DISCHARGE NOTE PROVIDER - HOSPITAL COURSE
58 yr old female with obesity, hypertension, Asthma/COPD, smoker, alcohol abuse, TIA, depression presents with 3 days of productive cough, chills and fever, not relieved with inhalers at home. Reported sick contacts at work. Smoker >35 yrs, no pets, not on home oxygen. Febrile in ED with leucocytosis, was admitted for COPD exacerbation. IV steroids and antibiotics were initiated.     1. COPD exacerbation:  Continue IV steroids, taper to q12 hrs  Duoneb prn  Continue Montelukast

## 2023-09-16 LAB
CULTURE RESULTS: SIGNIFICANT CHANGE UP
SPECIMEN SOURCE: SIGNIFICANT CHANGE UP

## 2023-12-10 RX ORDER — METOPROLOL TARTRATE 50 MG
1 TABLET ORAL
Refills: 0 | DISCHARGE

## 2023-12-10 RX ORDER — ASPIRIN/CALCIUM CARB/MAGNESIUM 324 MG
1 TABLET ORAL
Refills: 0 | DISCHARGE

## 2023-12-10 RX ORDER — FLUOXETINE HCL 10 MG
1 CAPSULE ORAL
Refills: 0 | DISCHARGE

## 2023-12-10 RX ORDER — ATORVASTATIN CALCIUM 80 MG/1
1 TABLET, FILM COATED ORAL
Refills: 0 | DISCHARGE

## 2023-12-10 RX ORDER — FAMOTIDINE 10 MG/ML
1 INJECTION INTRAVENOUS
Refills: 0 | DISCHARGE

## 2023-12-10 RX ORDER — LOPERAMIDE HCL 2 MG
1 TABLET ORAL
Refills: 0 | DISCHARGE

## 2023-12-10 RX ORDER — ALBUTEROL 90 UG/1
2 AEROSOL, METERED ORAL
Refills: 0 | DISCHARGE

## 2023-12-10 RX ORDER — NORTRIPTYLINE HYDROCHLORIDE 10 MG/1
1 CAPSULE ORAL
Refills: 0 | DISCHARGE

## 2023-12-10 RX ORDER — CLOPIDOGREL BISULFATE 75 MG/1
1 TABLET, FILM COATED ORAL
Refills: 0 | DISCHARGE

## 2023-12-10 RX ORDER — ACAMPROSATE CALCIUM 333 MG/1
1 TABLET, DELAYED RELEASE ORAL
Refills: 0 | DISCHARGE

## 2023-12-10 RX ORDER — MONTELUKAST 4 MG/1
1 TABLET, CHEWABLE ORAL
Refills: 0 | DISCHARGE

## 2023-12-10 RX ORDER — FLUTICASONE PROPIONATE AND SALMETEROL 50; 250 UG/1; UG/1
1 POWDER ORAL; RESPIRATORY (INHALATION)
Refills: 0 | DISCHARGE

## 2023-12-10 RX ORDER — MECLIZINE HCL 12.5 MG
1 TABLET ORAL
Refills: 0 | DISCHARGE
